# Patient Record
Sex: MALE
[De-identification: names, ages, dates, MRNs, and addresses within clinical notes are randomized per-mention and may not be internally consistent; named-entity substitution may affect disease eponyms.]

---

## 2017-01-01 ENCOUNTER — RX ONLY (RX ONLY)
Age: 52
End: 2017-01-01

## 2017-01-26 ENCOUNTER — TELEPHONE (OUTPATIENT)
Dept: LAB | Facility: CLINIC | Age: 52
End: 2017-01-26

## 2017-01-26 DIAGNOSIS — E03.9 HYPOTHYROIDISM, UNSPECIFIED TYPE: Primary | ICD-10-CM

## 2017-01-31 ENCOUNTER — TELEPHONE (OUTPATIENT)
Dept: LAB | Facility: CLINIC | Age: 52
End: 2017-01-31

## 2017-01-31 DIAGNOSIS — Z00.00 ENCOUNTER FOR ROUTINE ADULT HEALTH EXAMINATION WITHOUT ABNORMAL FINDINGS: Primary | ICD-10-CM

## 2017-01-31 DIAGNOSIS — Z00.00 ENCOUNTER FOR ROUTINE ADULT HEALTH EXAMINATION WITHOUT ABNORMAL FINDINGS: ICD-10-CM

## 2017-01-31 DIAGNOSIS — E03.9 HYPOTHYROIDISM, UNSPECIFIED TYPE: ICD-10-CM

## 2017-01-31 LAB
T4 FREE SERPL-MCNC: 1.25 NG/DL (ref 0.76–1.46)
TSH SERPL DL<=0.005 MIU/L-ACNC: 4.35 MU/L (ref 0.4–4)

## 2017-01-31 PROCEDURE — 84443 ASSAY THYROID STIM HORMONE: CPT | Performed by: FAMILY MEDICINE

## 2017-01-31 PROCEDURE — 80061 LIPID PANEL: CPT | Performed by: FAMILY MEDICINE

## 2017-01-31 PROCEDURE — 84439 ASSAY OF FREE THYROXINE: CPT | Performed by: FAMILY MEDICINE

## 2017-01-31 PROCEDURE — 36415 COLL VENOUS BLD VENIPUNCTURE: CPT | Performed by: FAMILY MEDICINE

## 2017-01-31 NOTE — TELEPHONE ENCOUNTER
Cali is requesting his fasting cholesterol. I drew him this morning and can hold the sample until Feb 3. Please order as future if appropriate and I will watch for it.    Thank you  Katty BROOKS [facility]

## 2017-02-01 ENCOUNTER — TELEPHONE (OUTPATIENT)
Dept: FAMILY MEDICINE | Facility: CLINIC | Age: 52
End: 2017-02-01

## 2017-02-01 DIAGNOSIS — E03.8 OTHER SPECIFIED HYPOTHYROIDISM: Primary | ICD-10-CM

## 2017-02-01 LAB
CHOLEST SERPL-MCNC: 215 MG/DL
HDLC SERPL-MCNC: 58 MG/DL
LDLC SERPL CALC-MCNC: 143 MG/DL
NONHDLC SERPL-MCNC: 157 MG/DL
TRIGL SERPL-MCNC: 71 MG/DL

## 2017-02-01 RX ORDER — LEVOTHYROXINE SODIUM 150 UG/1
150 TABLET ORAL DAILY
Qty: 90 TABLET | Refills: 0 | Status: SHIPPED | OUTPATIENT
Start: 2017-02-01 | End: 2017-03-01

## 2017-02-28 DIAGNOSIS — J30.2 SEASONAL ALLERGIC RHINITIS, UNSPECIFIED ALLERGIC RHINITIS TRIGGER: ICD-10-CM

## 2017-03-01 DIAGNOSIS — E03.8 OTHER SPECIFIED HYPOTHYROIDISM: ICD-10-CM

## 2017-03-01 NOTE — TELEPHONE ENCOUNTER
levothyroxine (SYNTHROID/LEVOTHROID) 150 MCG tablet       Last Written Prescription Date: 2/1/2017  Last Quantity: 90, # refills: 0  Last Office Visit with [facility], [facility] or [facility] prescribing provider: 11/23/2016        TSH   Date Value Ref Range Status   01/31/2017 4.35 (H) 0.40 - 4.00 mU/L Final

## 2017-03-01 NOTE — TELEPHONE ENCOUNTER
fluticasone (FLONASE) 50 MCG/ACT spray        Last Written Prescription Date: 11/23/2016  Last Fill Quantity: 1,  # refills: 1   Last Office Visit with [facility], [facility] or [facility] prescribing provider: 11/23/2016

## 2017-03-02 RX ORDER — LEVOTHYROXINE SODIUM 150 UG/1
TABLET ORAL
Qty: 90 TABLET | Refills: 0 | Status: SHIPPED | OUTPATIENT
Start: 2017-03-02 | End: 2017-05-09

## 2017-03-02 RX ORDER — FLUTICASONE PROPIONATE 50 MCG
SPRAY, SUSPENSION (ML) NASAL
Qty: 16 ML | Refills: 1 | Status: SHIPPED | OUTPATIENT
Start: 2017-03-02 | End: 2017-04-30

## 2017-03-02 NOTE — TELEPHONE ENCOUNTER
Prescription approved per [facility] Refill Protocol.  Tawana Szymanski RN

## 2017-03-30 DIAGNOSIS — E03.8 OTHER SPECIFIED HYPOTHYROIDISM: ICD-10-CM

## 2017-03-30 RX ORDER — LEVOTHYROXINE SODIUM 150 UG/1
TABLET ORAL
Qty: 90 TABLET | Refills: 0 | OUTPATIENT
Start: 2017-03-30

## 2017-03-30 NOTE — TELEPHONE ENCOUNTER
CHANGED DOSE Levothyroxine from 137 to 150 mcg per 1-31-17 lab result note and to recheck TSH 2 months   I called patient.  He is taking the 150 mcg and still has a supply on hand.  He said to disregard the request for 137mcg.   He will call the [facility] and sched lab only appt.  I ordered future TSH.      I thought the order was for 137 but now after 10 minutes it appears it is for 150 ????       levothyroxine (SYNTHROID, LEVOTHROID) 137 MCG tablet 90 tablet 0 11/1/2016  --   Sig: Take 1 tablet (137 mcg) by mouth daily          Last Written Prescription Date: 11/01/2016  Last Quantity: 90, # refills: 0  Last Office Visit with [facility] or [facility] prescribing provider: 11/2016        TSH   Date Value Ref Range Status   01/31/2017 4.35 (H) 0.40 - 4.00 mU/L Final     Notes Recorded by Semaj Conrad MD on 2/1/2017 at 11:43 AM  Your labs generally look fine.  Your TSH (thyroid test) is elevated suggesting that we should increase the dose of your synthroid to 150 micrograms per day.  I have sent this new prescription to your pharmacy.  We should recheck your tsh in two months.

## 2017-03-30 NOTE — TELEPHONE ENCOUNTER
I sent denial to pharmacy that too soon to fill as script was just sent in on 3/2/17  Patient will have TSH labs done at [facility]  Lab order already placed    Katherine Vuong RN

## 2017-04-07 DIAGNOSIS — E03.8 OTHER SPECIFIED HYPOTHYROIDISM: ICD-10-CM

## 2017-04-10 DIAGNOSIS — E03.8 OTHER SPECIFIED HYPOTHYROIDISM: ICD-10-CM

## 2017-04-10 PROCEDURE — 36415 COLL VENOUS BLD VENIPUNCTURE: CPT | Performed by: FAMILY MEDICINE

## 2017-04-10 PROCEDURE — 84443 ASSAY THYROID STIM HORMONE: CPT | Performed by: FAMILY MEDICINE

## 2017-04-11 LAB — TSH SERPL DL<=0.005 MIU/L-ACNC: 0.65 MU/L (ref 0.4–4)

## 2017-04-12 RX ORDER — LEVOTHYROXINE SODIUM 150 UG/1
TABLET ORAL
Qty: 90 TABLET | Refills: 0 | OUTPATIENT
Start: 2017-04-12

## 2017-04-30 DIAGNOSIS — J30.2 SEASONAL ALLERGIC RHINITIS, UNSPECIFIED ALLERGIC RHINITIS TRIGGER: ICD-10-CM

## 2017-05-01 RX ORDER — FLUTICASONE PROPIONATE 50 MCG
SPRAY, SUSPENSION (ML) NASAL
Qty: 16 ML | Refills: 1 | Status: SHIPPED | OUTPATIENT
Start: 2017-05-01 | End: 2018-12-19

## 2017-05-01 NOTE — TELEPHONE ENCOUNTER
Prescription approved per [facility] Refill Protocol.    Shirley Frank RN

## 2017-05-01 NOTE — TELEPHONE ENCOUNTER
Pending Prescriptions:                       Disp   Refills    fluticasone (FLONASE) 50 MCG/ACT spray [P*16 mL  1            Sig: USE TWO SPRAYS INTO EACH NOSTRIL ONCE DAILY          Last Written Prescription Date: 3/2/17  Last Fill Quantity: 16 ml,  # refills: 1   Last Office Visit with [facility], [facility] or [facility] prescribing provider: 11/23/16

## 2017-05-05 ENCOUNTER — OFFICE VISIT (OUTPATIENT)
Dept: FAMILY MEDICINE | Facility: CLINIC | Age: 52
End: 2017-05-05
Payer: COMMERCIAL

## 2017-05-05 VITALS
BODY MASS INDEX: 26.66 KG/M2 | SYSTOLIC BLOOD PRESSURE: 128 MMHG | DIASTOLIC BLOOD PRESSURE: 74 MMHG | HEART RATE: 61 BPM | WEIGHT: 180 LBS | RESPIRATION RATE: 16 BRPM | TEMPERATURE: 98.1 F | HEIGHT: 69 IN | OXYGEN SATURATION: 97 %

## 2017-05-05 DIAGNOSIS — R10.13 DYSPEPSIA: Primary | ICD-10-CM

## 2017-05-05 LAB
BASOPHILS # BLD AUTO: 0 10E9/L (ref 0–0.2)
BASOPHILS NFR BLD AUTO: 0.2 %
DIFFERENTIAL METHOD BLD: ABNORMAL
EOSINOPHIL # BLD AUTO: 0.4 10E9/L (ref 0–0.7)
EOSINOPHIL NFR BLD AUTO: 4.3 %
ERYTHROCYTE [DISTWIDTH] IN BLOOD BY AUTOMATED COUNT: 12.4 % (ref 10–15)
HCT VFR BLD AUTO: 38.4 % (ref 40–53)
HGB BLD-MCNC: 13.2 G/DL (ref 13.3–17.7)
LYMPHOCYTES # BLD AUTO: 1.9 10E9/L (ref 0.8–5.3)
LYMPHOCYTES NFR BLD AUTO: 22.7 %
MCH RBC QN AUTO: 31.5 PG (ref 26.5–33)
MCHC RBC AUTO-ENTMCNC: 34.4 G/DL (ref 31.5–36.5)
MCV RBC AUTO: 92 FL (ref 78–100)
MONOCYTES # BLD AUTO: 0.8 10E9/L (ref 0–1.3)
MONOCYTES NFR BLD AUTO: 9.3 %
NEUTROPHILS # BLD AUTO: 5.2 10E9/L (ref 1.6–8.3)
NEUTROPHILS NFR BLD AUTO: 63.5 %
PLATELET # BLD AUTO: 242 10E9/L (ref 150–450)
RBC # BLD AUTO: 4.19 10E12/L (ref 4.4–5.9)
WBC # BLD AUTO: 8.2 10E9/L (ref 4–11)

## 2017-05-05 PROCEDURE — 80053 COMPREHEN METABOLIC PANEL: CPT | Performed by: FAMILY MEDICINE

## 2017-05-05 PROCEDURE — 36415 COLL VENOUS BLD VENIPUNCTURE: CPT | Performed by: FAMILY MEDICINE

## 2017-05-05 PROCEDURE — 99214 OFFICE O/P EST MOD 30 MIN: CPT | Performed by: FAMILY MEDICINE

## 2017-05-05 PROCEDURE — 85025 COMPLETE CBC W/AUTO DIFF WBC: CPT | Performed by: FAMILY MEDICINE

## 2017-05-05 PROCEDURE — 83690 ASSAY OF LIPASE: CPT | Performed by: FAMILY MEDICINE

## 2017-05-05 RX ORDER — OMEPRAZOLE 40 MG/1
40 CAPSULE, DELAYED RELEASE ORAL DAILY
Qty: 30 CAPSULE | Refills: 1 | Status: SHIPPED | OUTPATIENT
Start: 2017-05-05 | End: 2018-12-19

## 2017-05-05 NOTE — NURSING NOTE
"Chief Complaint   Patient presents with     Abdominal Pain     stomach pain, diarrhea, steady pain, tightness x 7 days, but has been on and off for about 1 month, eating can trigger, taking Tums       Initial /74 (BP Location: Right arm, Patient Position: Chair, Cuff Size: Adult Large)  Pulse 61  Temp 98.1  F (36.7  C) (Oral)  Resp 16  Ht 5' 9\" (1.753 m)  Wt 180 lb (81.6 kg)  SpO2 97%  BMI 26.58 kg/m2 Estimated body mass index is 26.58 kg/(m^2) as calculated from the following:    Height as of this encounter: 5' 9\" (1.753 m).    Weight as of this encounter: 180 lb (81.6 kg).  Medication Reconciliation: ari Orellana CMA      "

## 2017-05-05 NOTE — PROGRESS NOTES
"  SUBJECTIVE:                                                    Neymar Traore is a 51 year old male who presents to clinic today for the following health issues:    Patient presents with:  Abdominal Pain: stomach pain, diarrhea, steady pain, tightness x 7 days, but has been on and off for about 1 month, eating can trigger, taking Tums    Patient with abdominal pain over the past week worsening but some symptoms intermittently over a month. Having midepigastric constant abdominal pain lasting 30 minutes to a few hours at a time.  Has had some worsening with meals at times but not consistently. Some improvement with calcium carbonate.    Patient Active Problem List    Diagnosis Date Noted     Pulmonary nodules 11/05/2016     Priority: Medium     Other specified hypothyroidism 10/21/2015     Priority: Medium     Hypothyroidism 04/03/2015     Priority: Medium     CHERISE (obstructive sleep apnea) 09/17/2014     Priority: Medium     BPH (benign prostatic hypertrophy) 06/29/2011     Priority: Medium     Social History   Substance Use Topics     Smoking status: Never Smoker     Smokeless tobacco: Never Used      Comment: <1 year of smoking, <1ppd over 30 years ago     Alcohol use 0.0 oz/week     0 Standard drinks or equivalent per week      Comment: 1-2 drinks per week-wine, has cut down     ROS:  CONSTITUTIONAL: No fever  GI: As noted    OBJECTIVE:                                                    /74 (BP Location: Right arm, Patient Position: Chair, Cuff Size: Adult Large)  Pulse 61  Temp 98.1  F (36.7  C) (Oral)  Resp 16  Ht 5' 9\" (1.753 m)  Wt 180 lb (81.6 kg)  SpO2 97%  BMI 26.58 kg/m2Body mass index is 26.58 kg/(m^2).  GENERAL APPEARANCE: healthy, alert and no distress  RESP: lungs clear to auscultation - no rales, rhonchi or wheezes  CV: regular rates and rhythm and no murmur, click or rub  ABDOMEN: soft, mild midepigastric tenderness without rebound or guarding, without hepatosplenomegaly or masses and " bowel sounds normal     ASSESSMENT/PLAN:                                                    1. Dyspepsia  Appears to be most consistent with dyspepsia. Recommend testing for H. pylori. Recommend trial of PPI and if improving symptoms continue for 2 months. Discussed dietary changes. If not improving consider right upper quadrant ultrasound to rule out gallbladder disease.  - omeprazole (PRILOSEC) 40 MG capsule; Take 1 capsule (40 mg) by mouth daily Take 30-60 minutes before a meal.  Dispense: 30 capsule; Refill: 1  - Lipase  - Comprehensive metabolic panel  - CBC with platelets differential  - H Pylori antigen stool; Future    James Radford MD  [facility]

## 2017-05-05 NOTE — MR AVS SNAPSHOT
After Visit Summary   5/5/2017    Neymar Traore    MRN: [id number]           Patient Information     Date Of Birth          1965        Visit Information        Provider Department      5/5/2017 2:15 PM Jmaes Radford MD [facility]        Today's Diagnoses     Dyspepsia    -  1       Follow-ups after your visit        Future tests that were ordered for you today     Open Future Orders        Priority Expected Expires Ordered    H Pylori antigen stool Routine  6/4/2017 5/5/2017            Who to contact     If you have questions or need follow up information about today's clinic visit or your schedule please contact [facility] directly at [phone].  Normal or non-critical lab and imaging results will be communicated to you by MyChart, letter or phone within 4 business days after the clinic has received the results. If you do not hear from us within 7 days, please contact the clinic through [employer] or phone. If you have a critical or abnormal lab result, we will notify you by phone as soon as possible.  Submit refill requests through [employer] or call your pharmacy and they will forward the refill request to us. Please allow 3 business days for your refill to be completed.          Additional Information About Your Visit        MyChart Information     [employer] gives you secure access to your electronic health record. If you see a primary care provider, you can also send messages to your care team and make appointments. If you have questions, please call your primary care clinic.  If you do not have a primary care provider, please call [phone] and they will assist you.        Care EveryWhere ID     This is your Care EveryWhere ID. This could be used by other organizations to access your [place] medical records  [id number]        Your Vitals Were     Pulse Temperature Respirations Height Pulse Oximetry BMI (Body Mass Index)    61 98.1  F (36.7  C) (Oral)  "16 5' 9\" (1.753 m) 97% 26.58 kg/m2       Blood Pressure from Last 3 Encounters:   05/05/17 128/74   11/23/16 129/80   10/27/16 127/84    Weight from Last 3 Encounters:   05/05/17 180 lb (81.6 kg)   11/23/16 179 lb (81.2 kg)   10/27/16 179 lb (81.2 kg)              We Performed the Following     CBC with platelets differential     Comprehensive metabolic panel     Lipase          Today's Medication Changes          These changes are accurate as of: 5/5/17  2:44 PM.  If you have any questions, ask your nurse or doctor.               Start taking these medicines.        Dose/Directions    omeprazole 40 MG capsule   Commonly known as:  priLOSEC   Used for:  Dyspepsia   Started by:  James Radford MD        Dose:  40 mg   Take 1 capsule (40 mg) by mouth daily Take 30-60 minutes before a meal.   Quantity:  30 capsule   Refills:  1            Where to get your medicines      These medications were sent to [place] [id number] [place] [id number]    Hours:  Tech issues with their phone system Phone:  [phone]     omeprazole 40 MG capsule                Primary Care Provider Office Phone # Fax #    James Radford [phone]       [facility] [address] [facility] [address]        Thank you!     Thank you for choosing [facility]  for your care. Our goal is always to provide you with excellent care. Hearing back from our patients is one way we can continue to improve our services. Please take a few minutes to complete the written survey that you may receive in the mail after your visit with us. Thank you!             Your Updated Medication List - Protect others around you: Learn how to safely use, store and throw away your medicines at www.disposemymeds.org.          This list is accurate as of: 5/5/17  2:44 PM.  Always use your most recent med list.                   Brand Name Dispense Instructions for " use    cetirizine 10 MG tablet    zyrTEC    30 tablet    Take 10 mg by mouth daily       fluticasone 50 MCG/ACT spray    FLONASE    16 mL    USE TWO SPRAYS INTO EACH NOSTRIL ONCE DAILY       levothyroxine 150 MCG tablet    SYNTHROID/LEVOTHROID    90 tablet    TAKE 1 TABLET BY MOUTH EVERY DAY       omeprazole 40 MG capsule    priLOSEC    30 capsule    Take 1 capsule (40 mg) by mouth daily Take 30-60 minutes before a meal.

## 2017-05-06 LAB
ALBUMIN SERPL-MCNC: 3.7 G/DL (ref 3.4–5)
ALP SERPL-CCNC: 68 U/L (ref 40–150)
ALT SERPL W P-5'-P-CCNC: 13 U/L (ref 0–70)
ANION GAP SERPL CALCULATED.3IONS-SCNC: 5 MMOL/L (ref 3–14)
AST SERPL W P-5'-P-CCNC: 10 U/L (ref 0–45)
BILIRUB SERPL-MCNC: 0.3 MG/DL (ref 0.2–1.3)
BUN SERPL-MCNC: 16 MG/DL (ref 7–30)
CALCIUM SERPL-MCNC: 8.4 MG/DL (ref 8.5–10.1)
CHLORIDE SERPL-SCNC: 104 MMOL/L (ref 94–109)
CO2 SERPL-SCNC: 33 MMOL/L (ref 20–32)
CREAT SERPL-MCNC: 1.1 MG/DL (ref 0.66–1.25)
GFR SERPL CREATININE-BSD FRML MDRD: 70 ML/MIN/1.7M2
GLUCOSE SERPL-MCNC: 97 MG/DL (ref 70–99)
LIPASE SERPL-CCNC: 80 U/L (ref 73–393)
POTASSIUM SERPL-SCNC: 3.8 MMOL/L (ref 3.4–5.3)
PROT SERPL-MCNC: 7.2 G/DL (ref 6.8–8.8)
SODIUM SERPL-SCNC: 142 MMOL/L (ref 133–144)

## 2017-05-09 DIAGNOSIS — E03.8 OTHER SPECIFIED HYPOTHYROIDISM: ICD-10-CM

## 2017-05-09 NOTE — TELEPHONE ENCOUNTER
Pt was seen for an acute issue only in clinic but PCP was changed.    Levothyroxine     Last Written Prescription Date: 3/2/17  Last Quantity: 90, # refills: 0  Last Office Visit with [facility] or [facility] prescribing provider: 5/5/17 for acute issue        TSH   Date Value Ref Range Status   04/10/2017 0.65 0.40 - 4.00 mU/L Final     Gabi Brothers RN, BSN

## 2017-05-10 DIAGNOSIS — R10.13 DYSPEPSIA: ICD-10-CM

## 2017-05-10 PROCEDURE — 87338 HPYLORI STOOL AG IA: CPT | Performed by: FAMILY MEDICINE

## 2017-05-11 RX ORDER — LEVOTHYROXINE SODIUM 150 UG/1
TABLET ORAL
Qty: 90 TABLET | Refills: 3 | Status: SHIPPED | OUTPATIENT
Start: 2017-05-11 | End: 2017-11-01 | Stop reason: DRUGHIGH

## 2017-05-12 LAB
H PYLORI AG STL QL IA: NORMAL
MICRO REPORT STATUS: NORMAL
SPECIMEN SOURCE: NORMAL

## 2017-05-24 ENCOUNTER — MYC MEDICAL ADVICE (OUTPATIENT)
Dept: FAMILY MEDICINE | Facility: CLINIC | Age: 52
End: 2017-05-24

## 2017-10-23 DIAGNOSIS — E03.8 OTHER SPECIFIED HYPOTHYROIDISM: Primary | ICD-10-CM

## 2017-10-23 LAB
T4 FREE SERPL-MCNC: 1.17 NG/DL (ref 0.76–1.46)
TSH SERPL DL<=0.005 MIU/L-ACNC: 4.67 MU/L (ref 0.4–4)

## 2017-10-23 PROCEDURE — 84439 ASSAY OF FREE THYROXINE: CPT | Performed by: FAMILY MEDICINE

## 2017-10-23 PROCEDURE — 36415 COLL VENOUS BLD VENIPUNCTURE: CPT | Performed by: FAMILY MEDICINE

## 2017-10-23 PROCEDURE — 84443 ASSAY THYROID STIM HORMONE: CPT | Performed by: FAMILY MEDICINE

## 2017-10-31 ENCOUNTER — TELEPHONE (OUTPATIENT)
Dept: FAMILY MEDICINE | Facility: CLINIC | Age: 52
End: 2017-10-31

## 2017-10-31 DIAGNOSIS — E03.9 HYPOTHYROIDISM, UNSPECIFIED TYPE: Primary | ICD-10-CM

## 2017-11-01 RX ORDER — LEVOTHYROXINE SODIUM 175 UG/1
175 TABLET ORAL DAILY
Qty: 90 TABLET | Refills: 0 | Status: SHIPPED | OUTPATIENT
Start: 2017-11-01 | End: 2018-12-19 | Stop reason: DRUGHIGH

## 2017-11-01 NOTE — TELEPHONE ENCOUNTER
TSH elevated suggests undertreated thyroid disease.  Make sure he is taking first thing in AM consistently and no with food or other meds (30-60 min prior).  Previous increases in the past year or so suggests that he is not consistent or had been off med at time of testing (was at 125 mg a year ago).  May consider increase if he has taken as recommended.

## 2017-11-01 NOTE — TELEPHONE ENCOUNTER
Pt is taking med as directed with no missed doses-       Please adjust does as needed.    175 mg sent in per VO from PCP-   Lab recheck in 8-12 weeks order placed    Pt expressed understanding and acceptance of the plan.  Pt had no further questions at this time.  Advised can call back to clinic at any time with concerns.       Message handled by Nurse Triage with [facility] - provider name: James Radford MD.      Carin Murphy RN

## 2017-12-26 DIAGNOSIS — E03.9 HYPOTHYROIDISM, UNSPECIFIED TYPE: ICD-10-CM

## 2017-12-26 LAB
T4 FREE SERPL-MCNC: 1.42 NG/DL (ref 0.76–1.46)
TSH SERPL DL<=0.005 MIU/L-ACNC: 0.2 MU/L (ref 0.4–4)

## 2017-12-26 PROCEDURE — 84439 ASSAY OF FREE THYROXINE: CPT | Performed by: FAMILY MEDICINE

## 2017-12-26 PROCEDURE — 84443 ASSAY THYROID STIM HORMONE: CPT | Performed by: FAMILY MEDICINE

## 2017-12-26 PROCEDURE — 36415 COLL VENOUS BLD VENIPUNCTURE: CPT | Performed by: FAMILY MEDICINE

## 2017-12-29 ENCOUNTER — TELEPHONE (OUTPATIENT)
Dept: FAMILY MEDICINE | Facility: CLINIC | Age: 52
End: 2017-12-29

## 2017-12-29 DIAGNOSIS — E03.9 HYPOTHYROIDISM: Primary | ICD-10-CM

## 2017-12-29 RX ORDER — LEVOTHYROXINE SODIUM 150 UG/1
150 TABLET ORAL DAILY
Qty: 90 TABLET | Refills: 0 | Status: SHIPPED | OUTPATIENT
Start: 2017-12-29 | End: 2018-04-02

## 2017-12-29 NOTE — TELEPHONE ENCOUNTER
"Pt notified of abnormal TSH-   He will start 150 mg today.  He will be leaving for business trip to [place] tomorrow for 1 week.     He notes that he has felt \"my heart has been irregular for the last 3-4 weeks\"     He states he feels irregular beats.   Denies chest pain, no jaw, arm or back pain.   No SOB.  He is able to work out/run as normal without issues.     Advised he should f/u with PCP on this but could be from abnormal tsh.   He states he will schedule OV when returns from business trip.   Advised to ER with any chest pain, or other symptoms as above.     Pt expressed understanding and acceptance of the plan.  Pt had no further questions at this time.  Advised can call back to clinic at any time with concerns.       Carin Murphy RN    "

## 2018-01-11 ENCOUNTER — E-VISIT (OUTPATIENT)
Dept: FAMILY MEDICINE | Facility: CLINIC | Age: 53
End: 2018-01-11
Payer: COMMERCIAL

## 2018-01-11 DIAGNOSIS — J01.00 ACUTE NON-RECURRENT MAXILLARY SINUSITIS: Primary | ICD-10-CM

## 2018-01-11 PROCEDURE — 99444 ZZC PHYSICIAN ONLINE EVALUATION & MANAGEMENT SERVICE: CPT | Performed by: FAMILY MEDICINE

## 2018-01-11 NOTE — TELEPHONE ENCOUNTER
LEVOTHYROXINE     Last Written Prescription Date: 03/02/17  Last Quantity: 90, # refills: 0  Last Office Visit with [facility] or [facility] prescribing provider: 11/23/16        TSH   Date Value Ref Range Status   01/31/2017 4.35 (H) 0.40 - 4.00 mU/L Final     Caterina Hobbs MA     General Sunscreen Counseling: I recommended a broad spectrum sunscreen with a SPF of 30 or higher.  I explained that SPF 30 sunscreens block approximately 97 percent of the sun's harmful rays.  Sunscreens should be applied at least 15 minutes prior to expected sun exposure and then every 2 hours after that as long as sun exposure continues. If swimming or exercising sunscreen should be reapplied every 45 minutes to an hour after getting wet or sweating.  One ounce, or the equivalent of a shot glass full of sunscreen, is adequate to protect the skin not covered by a bathing suit. I also recommended a lip balm with a sunscreen as well. Sun protective clothing can be used in lieu of sunscreen but must be worn the entire time you are exposed to the sun's rays. Detail Level: Generalized

## 2018-01-12 RX ORDER — AMOXICILLIN 500 MG/1
1000 CAPSULE ORAL 2 TIMES DAILY
Qty: 40 CAPSULE | Refills: 0 | Status: SHIPPED | OUTPATIENT
Start: 2018-01-12 | End: 2018-01-22

## 2018-01-26 DIAGNOSIS — E03.9 HYPOTHYROIDISM, UNSPECIFIED TYPE: ICD-10-CM

## 2018-01-26 RX ORDER — LEVOTHYROXINE SODIUM 175 UG/1
TABLET ORAL
Qty: 90 TABLET | Refills: 0 | OUTPATIENT
Start: 2018-01-26

## 2018-01-26 NOTE — TELEPHONE ENCOUNTER
Per December result note:  Notes Recorded by James Radford MD on 12/29/2017 at 8:40 AM  TSH low showing just slight overtreatment at this dose - I would go back to 150 mcg daily.  If that is not keeping him in range then we may set up an alternating schedule with 150/175 mcg but if 150 mcg can keep him in range with same dose daily that is ideal.    Pt is to be on 150mcg and follow up in 6-8 weeks for a lab only    Gabi Brothers RN, BSN

## 2018-01-26 NOTE — TELEPHONE ENCOUNTER
"Requested Prescriptions   Pending Prescriptions Disp Refills     levothyroxine (SYNTHROID/LEVOTHROID) 175 MCG tablet [Pharmacy Med Name: LEVOTHYROXINE 175 MCG TABLET] 90 tablet 0    Last Written Prescription Date:  11/01/2017  Last Fill Quantity: 90 tablet,  # refills: 0   Last Office Visit with 01/11/2018:     Future Office Visit:      Sig: TAKE 1 TABLET (175 MCG) BY MOUTH DAILY. ONE TIME FILL AT NEW DOSE, WILL NEED FOLLOWUP LAB    Thyroid Protocol Failed    1/26/2018  1:03 AM       Failed - Normal TSH on file in past 12 months    Recent Labs   Lab Test  12/26/17   0853   TSH  0.20*             Passed - Patient is 12 years or older       Passed - Recent or future visit with authorizing provider's specialty    Patient had office visit in the last year or has a visit in the next 30 days with authorizing provider.  See \"Patient Info\" tab in inbasket, or \"Choose Columns\" in Meds & Orders section of the refill encounter.               Jeff Philip XRT  "

## 2018-04-02 DIAGNOSIS — E03.9 HYPOTHYROIDISM, UNSPECIFIED TYPE: Primary | ICD-10-CM

## 2018-04-02 NOTE — TELEPHONE ENCOUNTER
"Requested Prescriptions   Pending Prescriptions Disp Refills     levothyroxine (SYNTHROID/LEVOTHROID) 150 MCG tablet [Pharmacy Med Name: LEVOTHYROXINE 150 MCG TABLET] 90 tablet 0    Last Written Prescription Date:  12/29/2017  Last Fill Quantity: 90 tablet,  # refills: 0   Last office visit: 5/5/2017 with prescribing provider:  01/11/2018   Future Office Visit:     Sig: TAKE 1 TABLET (150 MCG) BY MOUTH DAILY    Thyroid Protocol Failed    4/2/2018  1:11 AM       Failed - Normal TSH on file in past 12 months    Recent Labs   Lab Test  12/26/17   0853   TSH  0.20*             Passed - Patient is 12 years or older       Passed - Recent (12 mo) or future (30 days) visit within the authorizing provider's specialty    Patient had office visit in the last 12 months or has a visit in the next 30 days with authorizing provider or within the authorizing provider's specialty.  See \"Patient Info\" tab in inbasket, or \"Choose Columns\" in Meds & Orders section of the refill encounter.              Jeff Philip XRT  "

## 2018-04-03 ENCOUNTER — HOSPITAL ENCOUNTER (OUTPATIENT)
Dept: CT IMAGING | Facility: CLINIC | Age: 53
Discharge: HOME | End: 2018-04-03
Attending: FAMILY MEDICINE | Admitting: FAMILY MEDICINE
Payer: COMMERCIAL

## 2018-04-03 DIAGNOSIS — R91.8 PULMONARY NODULES: ICD-10-CM

## 2018-04-03 PROCEDURE — 71250 CT THORAX DX C-: CPT

## 2018-04-04 ENCOUNTER — TELEPHONE (OUTPATIENT)
Dept: FAMILY MEDICINE | Facility: CLINIC | Age: 53
End: 2018-04-04

## 2018-04-04 DIAGNOSIS — E03.9 HYPOTHYROIDISM, UNSPECIFIED TYPE: ICD-10-CM

## 2018-04-04 LAB — TSH SERPL DL<=0.005 MIU/L-ACNC: 2.83 MU/L (ref 0.4–4)

## 2018-04-04 PROCEDURE — 84443 ASSAY THYROID STIM HORMONE: CPT | Performed by: FAMILY MEDICINE

## 2018-04-04 PROCEDURE — 36415 COLL VENOUS BLD VENIPUNCTURE: CPT | Performed by: FAMILY MEDICINE

## 2018-04-04 RX ORDER — LEVOTHYROXINE SODIUM 150 UG/1
TABLET ORAL
Qty: 90 TABLET | Refills: 3 | Status: SHIPPED | OUTPATIENT
Start: 2018-04-04 | End: 2018-12-26

## 2018-04-04 NOTE — TELEPHONE ENCOUNTER
Pt notified of below     Pt expressed understanding and acceptance of the plan.  Pt had no further questions at this time.  Advised can call back to clinic at any time with concerns.       Carin Murphy RN

## 2018-04-04 NOTE — TELEPHONE ENCOUNTER
LM for call back -  See below     Carin Murphy, RN                Unchanged very small nodules in non-smoker stable over time - no follow-up needed as this is very likely to be benign.

## 2018-04-05 ENCOUNTER — TRANSFERRED RECORDS (OUTPATIENT)
Dept: HEALTH INFORMATION MANAGEMENT | Facility: CLINIC | Age: 53
End: 2018-04-05

## 2018-12-19 ENCOUNTER — OFFICE VISIT (OUTPATIENT)
Dept: FAMILY MEDICINE | Facility: CLINIC | Age: 53
End: 2018-12-19
Payer: COMMERCIAL

## 2018-12-19 VITALS
DIASTOLIC BLOOD PRESSURE: 82 MMHG | SYSTOLIC BLOOD PRESSURE: 124 MMHG | RESPIRATION RATE: 16 BRPM | OXYGEN SATURATION: 97 % | HEART RATE: 62 BPM | BODY MASS INDEX: 26.81 KG/M2 | HEIGHT: 69 IN | TEMPERATURE: 98.1 F | WEIGHT: 181 LBS

## 2018-12-19 DIAGNOSIS — Z23 NEED FOR PROPHYLACTIC VACCINATION AND INOCULATION AGAINST INFLUENZA: ICD-10-CM

## 2018-12-19 DIAGNOSIS — Z00.00 ROUTINE GENERAL MEDICAL EXAMINATION AT A HEALTH CARE FACILITY: Primary | ICD-10-CM

## 2018-12-19 DIAGNOSIS — E03.9 HYPOTHYROIDISM, UNSPECIFIED TYPE: ICD-10-CM

## 2018-12-19 LAB
ANION GAP SERPL CALCULATED.3IONS-SCNC: 4 MMOL/L (ref 3–14)
BUN SERPL-MCNC: 14 MG/DL (ref 7–30)
CALCIUM SERPL-MCNC: 8.8 MG/DL (ref 8.5–10.1)
CHLORIDE SERPL-SCNC: 106 MMOL/L (ref 94–109)
CHOLEST SERPL-MCNC: 237 MG/DL
CO2 SERPL-SCNC: 29 MMOL/L (ref 20–32)
CREAT SERPL-MCNC: 1.11 MG/DL (ref 0.66–1.25)
ERYTHROCYTE [DISTWIDTH] IN BLOOD BY AUTOMATED COUNT: 12.8 % (ref 10–15)
GFR SERPL CREATININE-BSD FRML MDRD: 74 ML/MIN/{1.73_M2}
GLUCOSE SERPL-MCNC: 92 MG/DL (ref 70–99)
HCT VFR BLD AUTO: 39.5 % (ref 40–53)
HDLC SERPL-MCNC: 46 MG/DL
HGB BLD-MCNC: 13.4 G/DL (ref 13.3–17.7)
LDLC SERPL CALC-MCNC: 163 MG/DL
MCH RBC QN AUTO: 31.5 PG (ref 26.5–33)
MCHC RBC AUTO-ENTMCNC: 33.9 G/DL (ref 31.5–36.5)
MCV RBC AUTO: 93 FL (ref 78–100)
NONHDLC SERPL-MCNC: 191 MG/DL
PLATELET # BLD AUTO: 211 10E9/L (ref 150–450)
POTASSIUM SERPL-SCNC: 4.2 MMOL/L (ref 3.4–5.3)
RBC # BLD AUTO: 4.25 10E12/L (ref 4.4–5.9)
SODIUM SERPL-SCNC: 139 MMOL/L (ref 133–144)
TRIGL SERPL-MCNC: 142 MG/DL
TSH SERPL DL<=0.005 MIU/L-ACNC: 1.94 MU/L (ref 0.4–4)
WBC # BLD AUTO: 4.1 10E9/L (ref 4–11)

## 2018-12-19 PROCEDURE — 84443 ASSAY THYROID STIM HORMONE: CPT | Performed by: FAMILY MEDICINE

## 2018-12-19 PROCEDURE — 90471 IMMUNIZATION ADMIN: CPT | Performed by: FAMILY MEDICINE

## 2018-12-19 PROCEDURE — 80048 BASIC METABOLIC PNL TOTAL CA: CPT | Performed by: FAMILY MEDICINE

## 2018-12-19 PROCEDURE — 36415 COLL VENOUS BLD VENIPUNCTURE: CPT | Performed by: FAMILY MEDICINE

## 2018-12-19 PROCEDURE — 80061 LIPID PANEL: CPT | Performed by: FAMILY MEDICINE

## 2018-12-19 PROCEDURE — 90686 IIV4 VACC NO PRSV 0.5 ML IM: CPT | Performed by: FAMILY MEDICINE

## 2018-12-19 PROCEDURE — 99396 PREV VISIT EST AGE 40-64: CPT | Mod: 25 | Performed by: FAMILY MEDICINE

## 2018-12-19 PROCEDURE — 85027 COMPLETE CBC AUTOMATED: CPT | Performed by: FAMILY MEDICINE

## 2018-12-19 ASSESSMENT — ENCOUNTER SYMPTOMS
JOINT SWELLING: 0
EYE PAIN: 0
FEVER: 0
CHILLS: 0
PALPITATIONS: 0
HEADACHES: 0
MYALGIAS: 0
FREQUENCY: 0
HEARTBURN: 0
SHORTNESS OF BREATH: 0
SORE THROAT: 0
DYSURIA: 0
HEMATURIA: 0
ARTHRALGIAS: 0
ABDOMINAL PAIN: 0
COUGH: 0
PARESTHESIAS: 0
HEMATOCHEZIA: 0
NAUSEA: 0
DIARRHEA: 0
WEAKNESS: 0
DIZZINESS: 0
CONSTIPATION: 0
NERVOUS/ANXIOUS: 0

## 2018-12-19 ASSESSMENT — MIFFLIN-ST. JEOR: SCORE: 1646.39

## 2018-12-19 NOTE — Clinical Note
Colonoscopy reported and normal 7/2014, PASCUAL done for [facility] and [facility]

## 2018-12-19 NOTE — PROGRESS NOTES
SUBJECTIVE:   CC: Neymar Traore is an 53 year old male who presents for preventative health visit.     Physical   Annual:     Getting at least 3 servings of Calcium per day:  NO    Bi-annual eye exam:  Yes    Dental care twice a year:  NO    Sleep apnea or symptoms of sleep apnea:  Excessive snoring and Sleep apnea    Diet:  Regular (no restrictions)    Frequency of exercise:  4-5 days/week    Duration of exercise:  45-60 minutes    Taking medications regularly:  Yes    Medication side effects:  Not applicable    Additional concerns today:  No    PHQ-2 Total Score: 0    History of hypothyroidism. Stable with levothyroxine.     History of mild-moderate obstructive sleep apnea. He did not tolerate the CPAP.     History of pulmonary nodules.     CT CHEST WITHOUT CONTRAST April 3, 2018 9:19 AM  IMPRESSION: Scattered small indeterminate pulmonary nodules in both  lungs measure 0.5 cm or smaller, and are unchanged dating back to  11/3/2016. Given the small size of these nodules and their stability  over time, these are highly likely to be of benign etiology.     Patient reports adverse effects while drinking red wine and beer. He describes this as a headache or groggy/cloudy feeling. Patient is able to drink other alcoholic beverages without issues.      Today's PHQ-2 Score:   PHQ-2 ( 1999 [employer]) 12/19/2018   Q1: Little interest or pleasure in doing things 0   Q2: Feeling down, depressed or hopeless 0   PHQ-2 Score 0   Q1: Little interest or pleasure in doing things Not at all   Q2: Feeling down, depressed or hopeless Not at all   PHQ-2 Score 0       Abuse: Current or Past(Physical, Sexual or Emotional)- No  Do you feel safe in your environment? Yes    Social History     Tobacco Use     Smoking status: Never Smoker     Smokeless tobacco: Never Used     Tobacco comment: <1 year of smoking, <1ppd over 30 years ago   Substance Use Topics     Alcohol use: Yes     Alcohol/week: 0.0 oz     Comment: 1-2 drinks per week-wine,  has cut down     Alcohol Use 12/19/2018   If you drink alcohol do you typically have greater than 3 drinks per day OR greater than 7 drinks per week? No       Last PSA:   PSA   Date Value Ref Range Status   10/27/2016 0.76 0 - 4 ug/L Final     Comment:     Assay Method:  Chemiluminescence using Siemens Vista analyzer       Reviewed orders with patient. Reviewed health maintenance and updated orders accordingly - Yes  Patient Active Problem List   Diagnosis     BPH (benign prostatic hypertrophy)     CHERISE (obstructive sleep apnea)     Hypothyroidism     Other specified hypothyroidism     Pulmonary nodules     Past Surgical History:   Procedure Laterality Date     ARTHROSCOPY KNEE RT/LT      Numerous surgeries on Rt. Knee, R  Patella Fracture 1981     Microscopic Hematuria      Negative Cystoscopy and CT scan in 2009     Pulmonary Nodules      No change on repeat CT scan in 2009, no repeat imaging at this point     SEPTOPLASTY, TURBINOPLASTY, COMBINED N/A 12/17/2014    Procedure: COMBINED SEPTOPLASTY, TURBINOPLASTY;  Surgeon: Mingo Pelayo MD;  Location: [facility] OR     SURGICAL HISTORY OF -       Lt. Elbow tendon surgery     TONSILLECTOMY       VASECTOMY         Social History     Tobacco Use     Smoking status: Never Smoker     Smokeless tobacco: Never Used     Tobacco comment: <1 year of smoking, <1ppd over 30 years ago   Substance Use Topics     Alcohol use: Yes     Alcohol/week: 0.0 oz     Comment: 1-2 drinks per week-wine, has cut down     Family History   Problem Relation Age of Onset     Family History Negative Mother         alive 70     Thyroid Disease Mother         low thyroid     Family History Negative Father         alive 70     Family History Negative Sister         alive 40     Family History Negative Maternal Grandmother         alive-had ovarina cancer in her 50's age     Cerebrovascular Disease Maternal Grandmother      Diabetes Maternal Grandfather         late in life     Diabetes Paternal Grandmother       Cerebrovascular Disease Paternal Grandmother      Neurologic Disorder Paternal Grandmother         RLS     Family History Negative Paternal Grandfather      Cancer - colorectal No family hx of      Prostate Cancer No family hx of      C.A.D. No family hx of            Reviewed and updated as needed this visit by clinical staff  Tobacco  Allergies  Meds  Problems  Med Hx  Surg Hx  Fam Hx         Reviewed and updated as needed this visit by Provider  Tobacco  Allergies  Meds  Problems  Med Hx  Surg Hx  Fam Hx        Past Medical History:   Diagnosis Date     BPH (benign prostatic hyperplasia)      Sleep apnea     no CPAP     Thyroid disease       Past Surgical History:   Procedure Laterality Date     ARTHROSCOPY KNEE RT/LT      Numerous surgeries on Rt. Knee, R  Patella Fracture 1981     Microscopic Hematuria      Negative Cystoscopy and CT scan in 2009     Pulmonary Nodules      No change on repeat CT scan in 2009, no repeat imaging at this point     SEPTOPLASTY, TURBINOPLASTY, COMBINED N/A 12/17/2014    Procedure: COMBINED SEPTOPLASTY, TURBINOPLASTY;  Surgeon: Mingo Pelayo MD;  Location: [facility] OR     SURGICAL HISTORY OF -       Lt. Elbow tendon surgery     TONSILLECTOMY       VASECTOMY         Review of Systems   Constitutional: Negative for chills and fever.   HENT: Negative for congestion, ear pain, hearing loss and sore throat.    Eyes: Negative for pain and visual disturbance.   Respiratory: Negative for cough and shortness of breath.    Cardiovascular: Negative for chest pain, palpitations and peripheral edema.   Gastrointestinal: Negative for abdominal pain, constipation, diarrhea, heartburn, hematochezia and nausea.   Genitourinary: Negative for discharge, dysuria, frequency, genital sores, hematuria, impotence and urgency.   Musculoskeletal: Negative for arthralgias, joint swelling and myalgias.   Skin: Negative for rash.   Neurological: Negative for dizziness, weakness, headaches and  "paresthesias.   Psychiatric/Behavioral: Negative for mood changes. The patient is not nervous/anxious.      This document serves as a record of the services and decisions personally performed and made by James Radford MD. It was created on his behalf by Mckenna Oakley, a trained medical scribe. The creation of this document is based on the provider's statements to the medical scribe.  Mckenna Oakley 8:49 AM December 19, 2018    OBJECTIVE:   /82 (BP Location: Right arm, Patient Position: Chair, Cuff Size: Adult Regular)   Pulse 62   Temp 98.1  F (36.7  C) (Oral)   Resp 16   Ht 1.753 m (5' 9\")   Wt 82.1 kg (181 lb)   SpO2 97%   BMI 26.73 kg/m      Physical Exam  GENERAL: healthy, alert and no distress  EYES: Eyes grossly normal to inspection, PERRL and conjunctivae and sclerae normal  HENT: ear canals and TM's normal, nose and mouth without ulcers or lesions  NECK: no adenopathy, no asymmetry, masses, or scars and thyroid normal to palpation  RESP: lungs clear to auscultation - no rales, rhonchi or wheezes  CV: regular rate and rhythm, normal S1 S2, no S3 or S4, no murmur, click or rub, no peripheral edema and peripheral pulses strong  ABDOMEN: soft, nontender, no hepatosplenomegaly, no masses and bowel sounds normal   (male): normal male genitalia without lesions or urethral discharge, no hernia  MS: no gross musculoskeletal defects noted, no edema  SKIN: no suspicious lesions or rashes  NEURO: Normal strength and tone, mentation intact and speech normal  PSYCH: mentation appears normal, affect normal/bright    Diagnostic Test Results:  No results found for this or any previous visit (from the past 24 hour(s)).    ASSESSMENT/PLAN:   1. Routine general medical examination at a health care facility  - Basic metabolic panel  - Lipid panel reflex to direct LDL Fasting    2. Need for prophylactic vaccination and inoculation against influenza    3. Hypothyroidism, unspecified type  - CBC with platelets  - " "Basic metabolic panel  - TSH with free T4 reflex    COUNSELING:   Reviewed preventive health counseling, as reflected in patient instructions  Special attention given to:        Immunizations    Vaccinated for: Influenza         Prostate cancer screening        Colon cancer screening     Discussed risk and benefit of prostate cancer screening with PSA testing including benefit of early detection and risk of unnecessary biopsy and patient anxiety and complications of treatment on cancer that may not have affected patient's health.  Discussed recommendations from USPSTF.  Patient declines PSA testing at this time.    BP Readings from Last 1 Encounters:   12/19/18 124/82     Estimated body mass index is 26.73 kg/m  as calculated from the following:    Height as of this encounter: 1.753 m (5' 9\").    Weight as of this encounter: 82.1 kg (181 lb).    BP Screening:   Last 3 BP Readings:    BP Readings from Last 3 Encounters:   12/19/18 124/82   05/05/17 128/74   11/23/16 129/80       The following was recommended to the patient:  Re-screen BP within a year and recommended lifestyle modifications  Weight management plan: Discussed healthy diet and exercise guidelines     reports that  has never smoked. he has never used smokeless tobacco.      Counseling Resources:  ATP IV Guidelines  Pooled Cohorts Equation Calculator  FRAX Risk Assessment  ICSI Preventive Guidelines  Dietary Guidelines for Americans, 2010  USDA's MyPlate  ASA Prophylaxis  Lung CA Screening    The information in this document, created by the medical scribe for me, accurately reflects the services I personally performed and the decisions made by me. I have reviewed and approved this document for accuracy prior to leaving the patient care area.  December 19, 2018 9:14 AM    James Radford MD  [facility]    Injectable Influenza Immunization Documentation    1.  Is the person to be vaccinated sick today?   No    2. Does the person to be " vaccinated have an allergy to a component   of the vaccine?   No  Egg Allergy Algorithm Link    3. Has the person to be vaccinated ever had a serious reaction   to influenza vaccine in the past?   No    4. Has the person to be vaccinated ever had Guillain-Barré syndrome?   No    Form completed by Cooper Orellana CMA

## 2018-12-19 NOTE — PROGRESS NOTES
INTRANASAL INFLUENZA IMMUNIZATION DOCUMENTATION    1. Is the person to be vaccinated sick today? No    2. Does the person to be vaccinated have an allergy to a component of the influenza vaccine? No    3. Has the person to be vaccinated ever had a serious reaction to influenza vaccine in the past? No    4. Is the person to be vaccinated younger than age 2  years or older than age 49 years? No    5. Does the person to be vaccinated have a long-term health problem with heart disease, lung disease (including asthma), kidney disease, neurologic disease, liver disease, disease (e.g., diabetes), or anemia or another blood disorder? No    6.  If the person to be vaccinated is a child age 2 through 4 years, in the past 12 months, has a health care provider told you the child had wheezing or asthma? No    7. Does the person to be vaccinated have cancer, leukemia, HIV/AIDS, or any other immune system problem; or, in the past 3 months, have they taken medications that affect the immune system, such as prednisone, other steroids, drugs for the treatment of rheumatoid arthritis, Crohn s disease, or psoriasis or anticancer drugs; or have they had radiation treatments? No    8. Is the person to be vaccinated receiving influenza antiviral medications? No    9. Is the person to be vaccinated a child or teen age 2 through 17 years and receiving aspirin therapy or aspirin-containing therapy? No    10. Is the person to be vaccinated pregnant or could she become pregnant within the next month? No    11. Has the person to be vaccinated ever had Guillain-Barré syndrome? No    12. Does the person to be vaccinated live with or expect to have close contact with a person whose immune system is severely compromised and who must be in protective isolation (e.g., an isolation room of a bone marrow transplant unit)? No    13. Has the person to be vaccinated received any other vaccinations in the past 4 weeks? No    Injectable Influenza  Immunization Documentation    1.  Is the person to be vaccinated sick today?   No    2. Does the person to be vaccinated have an allergy to a component   of the vaccine?   No  Egg Allergy Algorithm Link    3. Has the person to be vaccinated ever had a serious reaction   to influenza vaccine in the past?   No    4. Has the person to be vaccinated ever had Guillain-Barré syndrome?   No    Form completed by Cooper Orellana CMA

## 2018-12-26 DIAGNOSIS — E03.9 HYPOTHYROIDISM, UNSPECIFIED TYPE: ICD-10-CM

## 2018-12-26 RX ORDER — LEVOTHYROXINE SODIUM 150 UG/1
TABLET ORAL
Qty: 90 TABLET | Refills: 3 | Status: SHIPPED | OUTPATIENT
Start: 2018-12-26 | End: 2020-01-08

## 2019-06-24 ENCOUNTER — TRANSFERRED RECORDS (OUTPATIENT)
Dept: HEALTH INFORMATION MANAGEMENT | Facility: CLINIC | Age: 54
End: 2019-06-24

## 2019-07-30 ENCOUNTER — TELEPHONE (OUTPATIENT)
Dept: FAMILY MEDICINE | Facility: CLINIC | Age: 54
End: 2019-07-30

## 2019-07-30 NOTE — TELEPHONE ENCOUNTER
Patient scheduled an upcoming appointment could you please triage?   I am experiencing pain in my shoulder, chest, and left arm. The pain moves and is intermittent. It is not related to movement. I am concerned that it could be related to my thyroid medication    Also, scheduled a lab only w/o orders    Please advise    Annette Iglesias

## 2019-07-31 ENCOUNTER — NURSE TRIAGE (OUTPATIENT)
Dept: FAMILY MEDICINE | Facility: CLINIC | Age: 54
End: 2019-07-31

## 2019-07-31 DIAGNOSIS — E03.9 HYPOTHYROIDISM, UNSPECIFIED TYPE: Primary | ICD-10-CM

## 2019-07-31 NOTE — TELEPHONE ENCOUNTER
LM for call back and sent my chart with information as below -     Lab not appropriate     Carin Murphy RN

## 2019-07-31 NOTE — TELEPHONE ENCOUNTER
Unlikely to be thyroid related.  Recommend follow-up in clinic.  I would be concerned about cervical radiculopathy/cervical nerve issue but need to see him to evaluate.    TSH   Date Value Ref Range Status   12/19/2018 1.94 0.40 - 4.00 mU/L Final

## 2019-07-31 NOTE — TELEPHONE ENCOUNTER
Patient called back from [employer] message sent. Patient states that for the past 2-3 weeks he has been having back pain that moved to his neck, down his left shoulder and now into his left shoulder to elbow.     He went to an urgent care outside of the [place] system last week and stated the provider told him it maybe Rotater Cuff injury from recent [employer]. He does not believe this is the cause of pain. Patient stated that medication (possibe baclofen)  was given at the urgent care but stopped taking because his daughter is a physician and she told him this medication was for MS>   Has been taking 800mg of Ibuprofen daily.     Patient believes it is his thyroid. He stopped taking Thyroid medications for a couple days and then went back on it. He is unable to determine if he is taking Levothyroxine 150mcg or 175mcg because he takes the medications out of the bottle they are labeled with from the pharmacy.     Nurse asked that he call his pharmacy to see what was last filled for him so that clinic has clarification on dosing.     Patient believes he needs his TSH drawn to see thyroid levels as he believes this is what is causing what he says are muscle spasms in back, neck and left shoulder down to elbow.     Patient has lab and office visit scheduled in the next week.     Please advise on below order T'd up for lab.     Triaged per Epic Triage Protocol, gave care advice which patient plans to follow.  See Care advice tab for more information.  Patent to call back if further questions or concerns.    Additional Information    Negative: Passed out (i.e., fainted, collapsed and was not responding)    Negative: Shock suspected (e.g., cold/pale/clammy skin, too weak to stand, low BP, rapid pulse)    Negative: Sounds like a life-threatening emergency to the triager    Negative: Major injury to the back (e.g., MVA, fall > 10 feet or 3 meters, penetrating injury, etc.)    Negative: Pain in the upper back over  the ribs (rib cage) that radiates (travels) into the chest    Negative: Pain in the upper back over the ribs (rib cage) and worsened by coughing (or clearly increases with breathing)    Negative: SEVERE back pain of sudden onset and age > 60    Negative: SEVERE abdominal pain (e.g., excruciating)    Negative: Abdominal pain and age > 60    Negative: Unable to urinate (or only a few drops) and bladder feels very full    Negative: Loss of bladder or bowel control (urine or bowel incontinence; wetting self, leaking stool) of new onset    Negative: Numbness (loss of sensation) in groin or rectal area    Negative: Pain radiates into groin, scrotum    Negative: Blood in urine (red, pink, or tea-colored)    Negative: Vomiting and pain over lower ribs of back (i.e., flank - kidney area)    Negative: Weakness of a leg or foot (e.g., unable to bear weight, dragging foot)    Negative: Patient sounds very sick or weak to the triager    Negative: Fever > 100.5 F (38.1 C) and flank pain    Negative: Pain or burning with passing urine (urination)    Negative: SEVERE back pain (e.g., excruciating, unable to do any normal activities) and not improved after pain medicine and CARE ADVICE    Negative: Numbness in an arm or hand (i.e., loss of sensation) and upper back pain    Negative: Numbness in a leg or foot (i.e., loss of sensation)    Negative: High-risk adult (e.g., history of cancer, history of HIV, or history of IV drug abuse)    Negative: Painful rash with multiple small blisters grouped together (i.e., dermatomal distribution or 'band' or 'stripe')    Negative: Pain radiates into the thigh or further down the leg, and in both legs    Negative: Age > 50 and no history of prior similar back pain    MODERATE back pain (e.g., interferes with normal activities) and present > 3 days    Patient wants to be seen    Negative: Back pain lasts > 2 weeks    Negative: Back pain is a chronic symptom (recurrent or ongoing AND lasting > 4  weeks)    Negative: Back pain    Negative: Caused by a twisting, bending, or lifting injury    Negative: Caused by overuse from recent vigorous activity (e.g., exercise, gardening, lifting and carrying, sports)    Negative: Preventing back strain, questions about    Negative: Shock suspected (e.g., cold/pale/clammy skin, too weak to stand, low BP, rapid pulse)    Negative: Similar pain previously and it was from 'heart attack'    Negative: Similar pain previously and it was from 'angina' and not relieved by nitroglycerin    Negative: Sounds like a life-threatening emergency to the triager    Negative: Chest pain    Negative: Followed an injury to shoulder    Negative: Difficulty breathing or unusual sweating (e.g., sweating without exertion)    Negative: Pain lasting > 5 minutes and pain also present in chest (Exception: pain is clearly made worse by movement)    Negative: Age > 40 and no obvious cause and pain even when not moving the arm (Exception: pain is clearly made worse by moving arm or bending neck)    Negative: Red area or streak and fever    Negative: Swollen joint and fever    Negative: Entire arm is swollen    Negative: Patient sounds very sick or weak to the triager    Negative: SEVERE pain (e.g., excruciating, unable to do any normal activities)    Negative: Shoulder pains with exertion (e.g., walking) and pain goes away on resting and not present now    Negative: Painful rash with multiple small blisters grouped together (i.e., dermatomal distribution or 'band' or 'stripe')    Negative: Looks like a boil, infected sore, deep ulcer or other infected rash (spreading redness, pus)    Negative: Localized rash is very painful (no fever)    Negative: Weakness (i.e., loss of strength) in hand or fingers (Exception: not truly weak; hand feels weak because of pain)    Negative: Numbness (i.e., loss of sensation) in hand or fingers    Negative: Unable to use arm at all and because of shoulder pain or  "stiffness    Patient wants to be seen    MODERATE pain (e.g., interferes with normal activities) and present > 3 days    Negative: Pain is worsened or caused by bending the neck    Negative: MILD pain (e.g., does not interfere with normal activities) and present > 7 days    Negative: Shoulder pain is a chronic symptom (recurrent or ongoing AND present > 4 weeks)    Shoulder pain    Negative: Caused by overuse from recent vigorous activity (e.g., tennis, other sports, work involving overhead lifting)    Negative: Caused by strained muscle    Answer Assessment - Initial Assessment Questions  1. ONSET: \"When did the pain begin?\"       2-3 weeks ago  2. LOCATION: \"Where does it hurt?\" (upper, mid or lower back)     Back, neck, and now moved to left shoulder and arm down to elbow. You could feel muscle spasm.   3. SEVERITY: \"How bad is the pain?\"  (e.g., Scale 1-10; mild, moderate, or severe)       - MODERATE (4-7): interferes with normal activities or awakens from sleep     -   4. PATTERN: \"Is the pain constant?\" (e.g., yes, no; constant, intermittent)       Comes and goes an hour or so.   5. RADIATION: \"Does the pain shoot into your legs or elsewhere?\"      No   6. CAUSE:  \"What do you think is causing the back pain?\"       Tyroid   7. BACK OVERUSE:  \"Any recent lifting of heavy objects, strenuous work or exercise?\"  No but had done some landscaping. Urgent Care thought Rotater Cuff. Weakness.   8. MEDICATIONS: \"What have you taken so far for the pain?\" (e.g., nothing, acetaminophen, NSAIDS)  Yes. They put me on two medications at Urgent Care. Daughter told him to stop them because daughter is a physician. Was taking 800mg if Ibuprofen.   9. NEUROLOGIC SYMPTOMS: \"Do you have any weakness, numbness, or problems with bowel/bladder control?\"     Weakness in left arm.   10. OTHER SYMPTOMS: \"Do you have any other symptoms?\" (e.g., fever, abdominal pain, burning with urination, blood in urine)       NO.   11. PREGNANCY: \"Is " "there any chance you are pregnant?\" (e.g., yes, no; LMP)       No. Male.    Protocols used: BACK PAIN-A-OH, SHOULDER PAIN-A-OH    Kaitlyn Nunez RN Flex      "

## 2019-07-31 NOTE — TELEPHONE ENCOUNTER
Sent my chart need to review symptoms and will not place lab order until speaking with pt      Carin Murphy RN

## 2019-08-06 ENCOUNTER — OFFICE VISIT (OUTPATIENT)
Dept: FAMILY MEDICINE | Facility: CLINIC | Age: 54
End: 2019-08-06
Payer: COMMERCIAL

## 2019-08-06 VITALS
SYSTOLIC BLOOD PRESSURE: 118 MMHG | WEIGHT: 182 LBS | HEIGHT: 69 IN | TEMPERATURE: 98.5 F | BODY MASS INDEX: 26.96 KG/M2 | DIASTOLIC BLOOD PRESSURE: 78 MMHG | HEART RATE: 69 BPM | OXYGEN SATURATION: 98 % | RESPIRATION RATE: 16 BRPM

## 2019-08-06 DIAGNOSIS — M25.522 PAIN IN JOINT INVOLVING UPPER ARM, LEFT: ICD-10-CM

## 2019-08-06 DIAGNOSIS — R25.3 MUSCULAR FASCICULATION: ICD-10-CM

## 2019-08-06 DIAGNOSIS — E03.9 HYPOTHYROIDISM, UNSPECIFIED TYPE: Primary | ICD-10-CM

## 2019-08-06 PROCEDURE — 84439 ASSAY OF FREE THYROXINE: CPT | Performed by: FAMILY MEDICINE

## 2019-08-06 PROCEDURE — 36415 COLL VENOUS BLD VENIPUNCTURE: CPT | Performed by: FAMILY MEDICINE

## 2019-08-06 PROCEDURE — 83735 ASSAY OF MAGNESIUM: CPT | Performed by: FAMILY MEDICINE

## 2019-08-06 PROCEDURE — 99213 OFFICE O/P EST LOW 20 MIN: CPT | Performed by: FAMILY MEDICINE

## 2019-08-06 PROCEDURE — 80048 BASIC METABOLIC PNL TOTAL CA: CPT | Performed by: FAMILY MEDICINE

## 2019-08-06 PROCEDURE — 84443 ASSAY THYROID STIM HORMONE: CPT | Performed by: FAMILY MEDICINE

## 2019-08-06 ASSESSMENT — MIFFLIN-ST. JEOR: SCORE: 1645.93

## 2019-08-06 NOTE — PROGRESS NOTES
Subjective     Neymar Traore is a 54 year old male who presents to clinic today for the following health issues:    HPI     Patient here with left-sided chest pain that radiates into his left shoulder. The pain feels similar to a muscle cramp. The symptoms started one month ago and has improved during the past week and a half. [place] E[place] performed. They thought the pain was related to his rotator cuff. He was treated with an injection and a muscle relaxer. The pain improves with ibuprofen. Patient also had severe right hip pain while walking with his wife. This pain improved the next morning. Patient is concerned the muscle pain is related to the dose change of his levothyroxine. Denies numbness.     Patient Active Problem List   Diagnosis     BPH (benign prostatic hypertrophy)     CHERISE (obstructive sleep apnea)     Hypothyroidism     Other specified hypothyroidism     Pulmonary nodules     Past Surgical History:   Procedure Laterality Date     ARTHROSCOPY KNEE RT/LT      Numerous surgeries on Rt. Knee, R  Patella Fracture 1981     Microscopic Hematuria      Negative Cystoscopy and CT scan in 2009     Pulmonary Nodules      No change on repeat CT scan in 2009, no repeat imaging at this point     SEPTOPLASTY, TURBINOPLASTY, COMBINED N/A 12/17/2014    Procedure: COMBINED SEPTOPLASTY, TURBINOPLASTY;  Surgeon: Mingo Pelayo MD;  Location: [facility] OR     SURGICAL HISTORY OF -       Lt. Elbow tendon surgery     TONSILLECTOMY       VASECTOMY         Social History     Tobacco Use     Smoking status: Never Smoker     Smokeless tobacco: Never Used     Tobacco comment: <1 year of smoking, <1ppd over 30 years ago   Substance Use Topics     Alcohol use: Yes     Alcohol/week: 0.0 oz     Comment: 1-2 drinks per week-wine, has cut down     Family History   Problem Relation Age of Onset     Family History Negative Mother         alive 70     Thyroid Disease Mother         low thyroid      "Family History Negative Father         alive 70     Family History Negative Sister         alive 40     Family History Negative Maternal Grandmother         alive-had ovarina cancer in her 50's age     Cerebrovascular Disease Maternal Grandmother      Diabetes Maternal Grandfather         late in life     Diabetes Paternal Grandmother      Cerebrovascular Disease Paternal Grandmother      Neurologic Disorder Paternal Grandmother         RLS     Family History Negative Paternal Grandfather      Cancer - colorectal No family hx of      Prostate Cancer No family hx of      C.A.D. No family hx of          Reviewed and updated as needed this visit by Provider  Tobacco  Allergies  Meds  Problems  Med Hx  Surg Hx  Fam Hx       Review of Systems   ROS COMP: MUSCULOSKELETAL: as noted above   NEURO: NEGATIVE for weakness or paresthesias  ENDOCRINE: POSITIVE  for HX thyroid disease    This document serves as a record of the services and decisions personally performed and made by James Radford MD. It was created on his behalf by Mckenna Oakley, a trained medical scribe. The creation of this document is based on the provider's statements to the medical scribe.  Mckenna Oakley 3:06 PM August 6, 2019      Objective    /78 (BP Location: Right arm, Patient Position: Chair, Cuff Size: Adult Regular)   Pulse 69   Temp 98.5  F (36.9  C) (Oral)   Resp 16   Ht 1.753 m (5' 9\")   Wt 82.6 kg (182 lb)   SpO2 98%   BMI 26.88 kg/m    Body mass index is 26.88 kg/m .  Physical Exam   GENERAL: healthy, alert and no distress  MS: Neck nontender and restriction to leftward flexion. Left shoulder appearance normal, range of motion normal.  Negative Neer's test, Negative empty can test, and Negative Doyle-Jerson.  NEURO: Normal strength and tone, sensory exam grossly normal and mentation intact    Diagnostic Test Results:  Labs reviewed in Epic  No results found for this or any previous visit (from the past 24 hour(s)).      " "Assessment & Plan     1. Hypothyroidism, unspecified type  - TSH with free T4 reflex  - Basic metabolic panel  - Magnesium    2. Pain in joint involving upper arm, left  Symptoms are somewhat consistent with cervical radiculopathy or cervical spinal stenosis with pain and weakness.  This is improving now so difficult to assess.  As this is improving we will have continued watchful waiting.  Consider EMG and cervical spine MRI if symptoms worsen or follow-up with sports medicine to help with evaluation.    3. Muscular fasciculation  - TSH with free T4 reflex  - Basic metabolic panel  - Magnesium       BMI:   Estimated body mass index is 26.88 kg/m  as calculated from the following:    Height as of this encounter: 1.753 m (5' 9\").    Weight as of this encounter: 82.6 kg (182 lb).   Weight management plan: Discussed healthy diet and exercise guidelines    Return in about 1 month (around 9/6/2019) for if not improving or sooner if worsening symptoms.    The information in this document, created by the medical scribe for me, accurately reflects the services I personally performed and the decisions made by me. I have reviewed and approved this document for accuracy prior to leaving the patient care area.  August 6, 2019 3:27 PM    James Radford MD  [facility]        "

## 2019-08-07 LAB
ANION GAP SERPL CALCULATED.3IONS-SCNC: 4 MMOL/L (ref 3–14)
BUN SERPL-MCNC: 18 MG/DL (ref 7–30)
CALCIUM SERPL-MCNC: 8.7 MG/DL (ref 8.5–10.1)
CHLORIDE SERPL-SCNC: 105 MMOL/L (ref 94–109)
CO2 SERPL-SCNC: 31 MMOL/L (ref 20–32)
CREAT SERPL-MCNC: 1.14 MG/DL (ref 0.66–1.25)
GFR SERPL CREATININE-BSD FRML MDRD: 71 ML/MIN/{1.73_M2}
GLUCOSE SERPL-MCNC: 77 MG/DL (ref 70–99)
MAGNESIUM SERPL-MCNC: 2.3 MG/DL (ref 1.6–2.3)
POTASSIUM SERPL-SCNC: 4.8 MMOL/L (ref 3.4–5.3)
SODIUM SERPL-SCNC: 140 MMOL/L (ref 133–144)
T4 FREE SERPL-MCNC: 0.95 NG/DL (ref 0.76–1.46)
TSH SERPL DL<=0.005 MIU/L-ACNC: 4.55 MU/L (ref 0.4–4)

## 2019-08-29 ENCOUNTER — TRANSFERRED RECORDS (OUTPATIENT)
Dept: HEALTH INFORMATION MANAGEMENT | Facility: CLINIC | Age: 54
End: 2019-08-29

## 2019-09-16 ENCOUNTER — TRANSFERRED RECORDS (OUTPATIENT)
Dept: HEALTH INFORMATION MANAGEMENT | Facility: CLINIC | Age: 54
End: 2019-09-16

## 2019-09-30 ENCOUNTER — OFFICE VISIT (OUTPATIENT)
Dept: FAMILY MEDICINE | Facility: CLINIC | Age: 54
End: 2019-09-30
Payer: COMMERCIAL

## 2019-09-30 ENCOUNTER — HEALTH MAINTENANCE LETTER (OUTPATIENT)
Age: 54
End: 2019-09-30

## 2019-09-30 VITALS
OXYGEN SATURATION: 98 % | SYSTOLIC BLOOD PRESSURE: 120 MMHG | HEIGHT: 69 IN | WEIGHT: 183 LBS | RESPIRATION RATE: 18 BRPM | HEART RATE: 68 BPM | TEMPERATURE: 98.6 F | BODY MASS INDEX: 27.11 KG/M2 | DIASTOLIC BLOOD PRESSURE: 72 MMHG

## 2019-09-30 DIAGNOSIS — E04.1 THYROID NODULE: Primary | ICD-10-CM

## 2019-09-30 DIAGNOSIS — Z23 NEED FOR PROPHYLACTIC VACCINATION AND INOCULATION AGAINST INFLUENZA: ICD-10-CM

## 2019-09-30 DIAGNOSIS — M48.02 CERVICAL STENOSIS OF SPINE: ICD-10-CM

## 2019-09-30 PROCEDURE — 90682 RIV4 VACC RECOMBINANT DNA IM: CPT | Performed by: FAMILY MEDICINE

## 2019-09-30 PROCEDURE — 90471 IMMUNIZATION ADMIN: CPT | Performed by: FAMILY MEDICINE

## 2019-09-30 PROCEDURE — 99213 OFFICE O/P EST LOW 20 MIN: CPT | Mod: 25 | Performed by: FAMILY MEDICINE

## 2019-09-30 ASSESSMENT — MIFFLIN-ST. JEOR: SCORE: 1650.46

## 2019-09-30 NOTE — PROGRESS NOTES
Subjective     Neymar Traore is a 54 year old male who presents to clinic today for the following health issues:    HPI     Patient here for follow-up of left arm pain and weakness. His cervical MRI showed degenerative changes with stenosis. He attends physical therapy. The spine specialist at [facility] recommended surgical treatment. He is hesitant about pursuing surgery and wonders if other treatment options are available.     History of hypothyroidism, on levothyroxine. The MRI performed through [facility] revealed a growth on his thyroid. He was recommended to have a follow-up ultrasound performed. Denies thyroid specific symptoms.     Patient Active Problem List   Diagnosis     BPH (benign prostatic hypertrophy)     CHERISE (obstructive sleep apnea)     Hypothyroidism     Other specified hypothyroidism     Pulmonary nodules     Cervical stenosis of spine     Past Surgical History:   Procedure Laterality Date     ARTHROSCOPY KNEE RT/LT      Numerous surgeries on Rt. Knee, R  Patella Fracture 1981     Microscopic Hematuria      Negative Cystoscopy and CT scan in 2009     Pulmonary Nodules      No change on repeat CT scan in 2009, no repeat imaging at this point     SEPTOPLASTY, TURBINOPLASTY, COMBINED N/A 12/17/2014    Procedure: COMBINED SEPTOPLASTY, TURBINOPLASTY;  Surgeon: Mingo Pelayo MD;  Location:  OR     SURGICAL HISTORY OF -       Lt. Elbow tendon surgery     TONSILLECTOMY       VASECTOMY         Social History     Tobacco Use     Smoking status: Never Smoker     Smokeless tobacco: Never Used     Tobacco comment: <1 year of smoking, <1ppd over 30 years ago   Substance Use Topics     Alcohol use: Yes     Alcohol/week: 0.0 standard drinks     Comment: 1-2 drinks per week-wine, has cut down     Family History   Problem Relation Age of Onset     Family History Negative Mother         alive 70     Thyroid Disease Mother         low thyroid     Family History Negative Father        "  alive 70     Family History Negative Sister         alive 40     Family History Negative Maternal Grandmother         alive-had ovarina cancer in her 50's age     Cerebrovascular Disease Maternal Grandmother      Diabetes Maternal Grandfather         late in life     Diabetes Paternal Grandmother      Cerebrovascular Disease Paternal Grandmother      Neurologic Disorder Paternal Grandmother         RLS     Family History Negative Paternal Grandfather      Cancer - colorectal No family hx of      Prostate Cancer No family hx of      C.A.D. No family hx of          Reviewed and updated as needed this visit by Provider  Tobacco  Allergies  Meds  Problems  Med Hx  Surg Hx  Fam Hx       Review of Systems   ROS COMP: MUSCULOSKELETAL: as noted above   NEURO: as noted above   ENDOCRINE: POSITIVE  for HX thyroid disease    This document serves as a record of the services and decisions personally performed and made by James Radford MD. It was created on his behalf by Mckenna Oakley, a trained medical scribe. The creation of this document is based on the provider's statements to the medical scribe.  Mckenna Oakley 10:23 AM September 30, 2019      Objective    /72 (BP Location: Right arm, Patient Position: Chair, Cuff Size: Adult Regular)   Pulse 68   Temp 98.6  F (37  C) (Oral)   Resp 18   Ht 1.753 m (5' 9\")   Wt 83 kg (183 lb)   SpO2 98%   BMI 27.02 kg/m    Body mass index is 27.02 kg/m .  Physical Exam   GENERAL: healthy, alert and no distress    Diagnostic Test Results:  Labs reviewed in Epic      Assessment & Plan     1. Thyroid nodule  Recommend ultrasound of thyroid with abnormal finding on MRI of 1.7 cm nodule.  - US Thyroid; Future    2. Cervical stenosis of spine  Discussed treatment options with patient.  He is seeing a spine clinic.  He is interested in getting a second opinion.  With his arm weakness and muscle fasciculations we discussed leaning towards surgical intervention versus continued " "conservative management based on those findings.    3. Need for prophylactic vaccination and inoculation against influenza  - INFLUENZA QUAD, RECOMBINANT, P-FREE (RIV4) (FLUBLOCK) [08340]  - Vaccine Administration, Initial [56876]       BMI:   Estimated body mass index is 27.02 kg/m  as calculated from the following:    Height as of this encounter: 1.753 m (5' 9\").    Weight as of this encounter: 83 kg (183 lb).   Weight management plan: Discussed healthy diet and exercise guidelines    Return in about 2 months (around 11/30/2019) for if not improving or sooner if worsening symptoms.    The information in this document, created by the medical scribe for me, accurately reflects the services I personally performed and the decisions made by me. I have reviewed and approved this document for accuracy prior to leaving the patient care area.  September 30, 2019 10:39 AM    James Radford MD  [facility]        "

## 2019-10-02 ENCOUNTER — HOSPITAL ENCOUNTER (OUTPATIENT)
Dept: ULTRASOUND IMAGING | Facility: CLINIC | Age: 54
Discharge: HOME | End: 2019-10-02
Attending: FAMILY MEDICINE | Admitting: FAMILY MEDICINE
Payer: COMMERCIAL

## 2019-10-02 DIAGNOSIS — E04.1 THYROID NODULE: ICD-10-CM

## 2019-10-02 PROCEDURE — 76536 US EXAM OF HEAD AND NECK: CPT

## 2019-10-03 ENCOUNTER — MYC MEDICAL ADVICE (OUTPATIENT)
Dept: FAMILY MEDICINE | Facility: CLINIC | Age: 54
End: 2019-10-03

## 2019-10-03 DIAGNOSIS — E04.1 THYROID NODULE: Primary | ICD-10-CM

## 2019-10-03 NOTE — TELEPHONE ENCOUNTER
See my chart and result note from PCP     As PCP is out of clinic until next week please sign order.      Endo is booking out several months.     Carin Murphy RN

## 2019-10-07 ENCOUNTER — ANCILLARY PROCEDURE (OUTPATIENT)
Dept: ULTRASOUND IMAGING | Facility: CLINIC | Age: 54
End: 2019-10-07
Attending: FAMILY MEDICINE
Payer: COMMERCIAL

## 2019-10-07 DIAGNOSIS — E04.1 THYROID NODULE: ICD-10-CM

## 2019-10-07 PROCEDURE — 10006 FNA BX W/US GDN EA ADDL: CPT | Performed by: RADIOLOGY

## 2019-10-07 PROCEDURE — 00000102 ZZHCL STATISTIC CYTO WRIGHT STAIN TC: Performed by: FAMILY MEDICINE

## 2019-10-07 PROCEDURE — 88173 CYTOPATH EVAL FNA REPORT: CPT | Performed by: FAMILY MEDICINE

## 2019-10-07 PROCEDURE — 10005 FNA BX W/US GDN 1ST LES: CPT | Performed by: RADIOLOGY

## 2019-10-07 RX ADMIN — Medication 1 MEQ: at 09:05

## 2019-10-07 NOTE — PROGRESS NOTES
Cali was seen in ultrasound today for a fine needle aspiration of two left thyroid nodules. Procedure was explained and consent was obtained. The entire neck was prepped and sterile drape was applied. 9 ml Lidocaine (NDC 2918-1521-39) with 1 ml 8.4% Na Bicarbonate (NDC 7942-7190-03) was used to anesthetize the skin and subcutaneous tissue. Procedure was performed without complications. Pain at start of procedure 0/10. Pain at end of procedure 0/10. Ice applied to left anterior neck for 5 minutes post procedure. Patient d/c with home care and follow-up instructions.  Procedure performed by: Dr. Boyer  Other staff present: DAVID Saleh and Caterina.

## 2019-10-08 LAB — COPATH REPORT: NORMAL

## 2019-10-09 ENCOUNTER — TELEPHONE (OUTPATIENT)
Dept: FAMILY MEDICINE | Facility: CLINIC | Age: 54
End: 2019-10-09

## 2019-10-09 DIAGNOSIS — E04.1 THYROID NODULE: Primary | ICD-10-CM

## 2019-10-09 NOTE — TELEPHONE ENCOUNTER
Discussed thyroid FNA with atypia of undetermined significance.  Discussed consideration for lobectomy versus reflux molecular testing on sample versus repeat FNA in 4 to 6 weeks.  Patient at this point deciding for repeat FNA.  At that point if AUS is seen again can consider lobectomy versus molecular testing.  Repeat FNA ordered.      B.  Thyroid, inferior left #2, ultrasound guided fine needle aspiration:   - Atypia of undetermined significance     Atypia of undetermined significance   The [place] implied risk of malignancy and recommended clinical   management:   Atypia of undetermined significance has a 10-30% risk of malignancy,   recommend repeat FNA in 4-6 weeks,   molecular testing or lobectomy

## 2019-10-28 ENCOUNTER — TRANSFERRED RECORDS (OUTPATIENT)
Dept: HEALTH INFORMATION MANAGEMENT | Facility: CLINIC | Age: 54
End: 2019-10-28

## 2019-11-04 ENCOUNTER — OFFICE VISIT (OUTPATIENT)
Dept: FAMILY MEDICINE | Facility: CLINIC | Age: 54
End: 2019-11-04
Payer: COMMERCIAL

## 2019-11-04 VITALS
OXYGEN SATURATION: 99 % | TEMPERATURE: 98.5 F | DIASTOLIC BLOOD PRESSURE: 72 MMHG | BODY MASS INDEX: 27.25 KG/M2 | SYSTOLIC BLOOD PRESSURE: 128 MMHG | WEIGHT: 184 LBS | HEIGHT: 69 IN | HEART RATE: 64 BPM | RESPIRATION RATE: 16 BRPM

## 2019-11-04 DIAGNOSIS — Z01.818 PREOP GENERAL PHYSICAL EXAM: Primary | ICD-10-CM

## 2019-11-04 DIAGNOSIS — E03.9 HYPOTHYROIDISM, UNSPECIFIED TYPE: ICD-10-CM

## 2019-11-04 DIAGNOSIS — E04.1 THYROID NODULE: ICD-10-CM

## 2019-11-04 DIAGNOSIS — M77.01 MEDIAL EPICONDYLITIS OF ELBOW, RIGHT: ICD-10-CM

## 2019-11-04 LAB
ERYTHROCYTE [DISTWIDTH] IN BLOOD BY AUTOMATED COUNT: 12.6 % (ref 10–15)
HCT VFR BLD AUTO: 37.9 % (ref 40–53)
HGB BLD-MCNC: 13 G/DL (ref 13.3–17.7)
MCH RBC QN AUTO: 31.4 PG (ref 26.5–33)
MCHC RBC AUTO-ENTMCNC: 34.3 G/DL (ref 31.5–36.5)
MCV RBC AUTO: 92 FL (ref 78–100)
PLATELET # BLD AUTO: 217 10E9/L (ref 150–450)
RBC # BLD AUTO: 4.14 10E12/L (ref 4.4–5.9)
WBC # BLD AUTO: 4.6 10E9/L (ref 4–11)

## 2019-11-04 PROCEDURE — 85027 COMPLETE CBC AUTOMATED: CPT | Performed by: FAMILY MEDICINE

## 2019-11-04 PROCEDURE — 99215 OFFICE O/P EST HI 40 MIN: CPT | Performed by: FAMILY MEDICINE

## 2019-11-04 PROCEDURE — 36415 COLL VENOUS BLD VENIPUNCTURE: CPT | Performed by: FAMILY MEDICINE

## 2019-11-04 ASSESSMENT — MIFFLIN-ST. JEOR: SCORE: 1655

## 2019-11-04 NOTE — PROGRESS NOTES
[facility]  [address] [place] [id number] [phone]  Dept: [phone]    PRE-OP EVALUATION:  Today's date: 2019    Neymar Traore (: 1965) presents for pre-operative evaluation assessment as requested by Dr. Conrad.  He requires evaluation and anesthesia risk assessment prior to undergoing surgery/procedure for treatment of right elbow .    Fax number for surgical facility: [phone]  Primary Physician: James Radford  Type of Anesthesia Anticipated: General    Patient has a Health Care Directive or Living Will:  YES     Preop Questions 2019   Who is doing your surgery? Dr. Malachi Conrad   What are you having done? [facility]   Date of Surgery/Procedure: 2019   Facility or Hospital where procedure/surgery will be performed: [facility]   1.  Do you have a history of Heart attack, stroke, stent, coronary bypass surgery, or other heart surgery? No   2.  Do you ever have any pain or discomfort in your chest? No   3.  Do you have a history of  Heart Failure? No   4.   Are you troubled by shortness of breath when:  walking on a level surface, or up a slight hill, or at night? No   5.  Do you currently have a cold, bronchitis or other respiratory infection? No   6.  Do you have a cough, shortness of breath, or wheezing? No   7.  Do you sometimes get pains in the calves of your legs when you walk? No   8. Do you or anyone in your family have previous history of blood clots? No   9.  Do you or does anyone in your family have a serious bleeding problem such as prolonged bleeding following surgeries or cuts? No   10. Have you ever had problems with anemia or been told to take iron pills? No   11. Have you had any abnormal blood loss such as black, tarry or bloody stools? No   12. Have you ever had a blood transfusion? No   13. Have you or any of your relatives ever had problems with anesthesia? No   14. Do you have sleep apnea, excessive snoring or  daytime drowsiness? YES - all of the above, reported history of sleep apnea currently not using a CPAP machine.  Reason not specified.   15. Do you have any prosthetic heart valves? No   16. Do you have prosthetic joints? No       HPI:     HPI related to upcoming procedure: Patient is a pleasant 54-year-old male,  who presents the clinic for preoperative evaluation of right medial epicondylitis.  Chronic issue.  Refractory to medical management.  Currently follows with [facility] orthopedics will be performing a release surgery.    HYPOTHYROIDISM - Patient has a longstanding history of chronic Hypothyroidism. Patient has been doing well, noting no tremor, insomnia, hair loss or changes in skin texture.  He does report unintentional weight gain.  Continues to take medications as directed, without adverse reactions or side effects. Last TSH     Lab Results   Component Value Date    TSH 4.55 (H) 08/06/2019       MEDICAL HISTORY:     Patient Active Problem List    Diagnosis Date Noted     Cervical stenosis of spine 09/30/2019     Priority: Medium     Pulmonary nodules 11/05/2016     Priority: Medium     Other specified hypothyroidism 10/21/2015     Priority: Medium     Hypothyroidism 04/03/2015     Priority: Medium     CHERISE (obstructive sleep apnea) 09/17/2014     Priority: Medium     BPH (benign prostatic hypertrophy) 06/29/2011     Priority: Medium      Past Medical History:   Diagnosis Date     BPH (benign prostatic hyperplasia)      Sleep apnea     no CPAP     Thyroid disease      Past Surgical History:   Procedure Laterality Date     ARTHROSCOPY KNEE RT/LT      Numerous surgeries on Rt. Knee, R  Patella Fracture 1981     Microscopic Hematuria      Negative Cystoscopy and CT scan in 2009     Pulmonary Nodules      No change on repeat CT scan in 2009, no repeat imaging at this point     SEPTOPLASTY, TURBINOPLASTY, COMBINED N/A 12/17/2014    Procedure: COMBINED SEPTOPLASTY, TURBINOPLASTY;  Surgeon:  "Mingo Pelayo MD;  Location: [facility] OR     SURGICAL HISTORY OF -       Lt. Elbow tendon surgery     TONSILLECTOMY       VASECTOMY       Current Outpatient Medications   Medication Sig Dispense Refill     cetirizine (ZYRTEC) 10 MG tablet Take 10 mg by mouth daily  30 tablet 1     levothyroxine (SYNTHROID/LEVOTHROID) 150 MCG tablet TAKE 1 TABLET (150 MCG) BY MOUTH DAILY 90 tablet 3     OTC products: NSAIDS. Excedrin and Ibuprofen.     Allergies   Allergen Reactions     Codeine      Nausea and Vomiting      Latex Allergy: NO    Social History     Tobacco Use     Smoking status: Never Smoker     Smokeless tobacco: Never Used     Tobacco comment: <1 year of smoking, <1ppd over 30 years ago   Substance Use Topics     Alcohol use: Yes     Alcohol/week: 0.0 standard drinks     Comment: 1-2 drinks per week-wine, has cut down     History   Drug Use No       REVIEW OF SYSTEMS:   Constitutional, neuro, ENT, endocrine, pulmonary, cardiac, gastrointestinal, genitourinary, musculoskeletal, integument and psychiatric systems are negative, except as otherwise noted.    EXAM:   /72 (BP Location: Right arm, Patient Position: Chair, Cuff Size: Adult Regular)   Pulse 64   Temp 98.5  F (36.9  C) (Oral)   Resp 16   Ht 1.753 m (5' 9\")   Wt 83.5 kg (184 lb)   SpO2 99%   BMI 27.17 kg/m      GENERAL APPEARANCE: healthy, alert and no distress     EYES: EOMI,  PERRL     HENT: ear canals and TM's normal and nose and mouth without ulcers or lesions     NECK: no adenopathy, no asymmetry, masses, or scars and thyroid normal to palpation     RESP: lungs clear to auscultation - no rales, rhonchi or wheezes     CV: regular rates and rhythm, normal S1 S2, no S3 or S4 and no murmur, click or rub     ABDOMEN:  soft, nontender, no HSM or masses and bowel sounds normal     MS: extremities normal- no gross deformities noted, no evidence of inflammation in joints, FROM in all extremities.     SKIN: no suspicious lesions or rashes     NEURO: " Normal strength and tone, sensory exam grossly normal, mentation intact and speech normal     PSYCH: mentation appears normal. and affect normal/bright     LYMPHATICS: No cervical adenopathy    DIAGNOSTICS:     EKG: Not indicated due to non-vascular surgery and low risk of event (age <65 and without cardiac risk factors)  Labs Drawn and in Process:   Unresulted Labs Ordered in the Past 30 Days of this Admission     Date and Time Order Name Status Description    11/4/2019 1148 CBC WITH PLATELETS In process           Recent Labs   Lab Test 08/06/19  1539 12/19/18  0919 05/05/17  1449   HGB  --  13.4 13.2*   PLT  --  211 242    139 142   POTASSIUM 4.8 4.2 3.8   CR 1.14 1.11 1.10      TSH   Date Value Ref Range Status   08/06/2019 4.55 (H) 0.40 - 4.00 mU/L Final   12/19/2018 1.94 0.40 - 4.00 mU/L Final   04/04/2018 2.83 0.40 - 4.00 mU/L Final   12/26/2017 0.20 (L) 0.40 - 4.00 mU/L Final   10/23/2017 4.67 (H) 0.40 - 4.00 mU/L Final       FNA: 10-7-2019  Thyroid, inferior left #2, ultrasound guided fine needle aspiration:   - Atypia of undetermined significance     Atypia of undetermined significance   The [place] implied risk of malignancy and recommended clinical   management:     Atypia of undetermined significance has a 10-30% risk of malignancy,   recommend repeat FNA in 4-6 weeks,   molecular testing or lobectomy     IMPRESSION:   Reason for surgery/procedure: Right medial epicondylitis, refractory to medical management.   Diagnosis/reason for consult: Pre-operative evaluation    The proposed surgical procedure is considered INTERMEDIATE risk.    REVISED CARDIAC RISK INDEX  The patient has the following serious cardiovascular risks for perioperative complications such as (MI, PE, VFib and 3  AV Block):  No serious cardiac risks  INTERPRETATION: 0 risks: Class I (very low risk - 0.4% complication rate)    The patient has the following additional risks for perioperative complications:  No identified additional  risks      ICD-10-CM    1. Preop general physical exam Z01.818 CBC with platelets   2. Medial epicondylitis of elbow, right M77.01    3. Hypothyroidism, unspecified type E03.9    4. Thyroid nodule E04.1        RECOMMENDATIONS:       Obstructive Sleep Apnea (or suspected sleep apnea)  Hospital staff are advised to monitor for sleep related oxygen desaturations due to suspicion of CHERISE    Hypothyroidism, Thyroid Nodule  Subtherapeutic, take Synthroid on day of surgery with small sip of water.  Schedule follow-up with PCP for Synthroid titration.  Dose not need to be adjusted prior to surgery as his vitals are normal and currently has a normal neurological exam.  Repeat FNA for thyroid nodule atypia, already planned.    --Patient is to take all scheduled medications on the day of surgery EXCEPT for modifications listed below.    Anticoagulant or Antiplatelet Medication Use  NSAIDS: Ibuprofen (Motrin):         Stop one day prior to surgery to decrease risk of perioperative bleeding.        APPROVAL GIVEN to proceed with proposed procedure, without further diagnostic evaluation       Signed Electronically by: Aki Sow MD    Copy of this evaluation report is provided to requesting physician.    [place] Preop Guidelines    Revised Cardiac Risk Index

## 2019-11-25 ENCOUNTER — HOSPITAL ENCOUNTER (OUTPATIENT)
Dept: ULTRASOUND IMAGING | Facility: CLINIC | Age: 54
Discharge: HOME | End: 2019-11-25
Attending: FAMILY MEDICINE | Admitting: FAMILY MEDICINE
Payer: COMMERCIAL

## 2019-11-25 DIAGNOSIS — E04.1 THYROID NODULE: ICD-10-CM

## 2019-11-25 PROCEDURE — 88173 CYTOPATH EVAL FNA REPORT: CPT | Mod: 26 | Performed by: PHYSICIAN ASSISTANT

## 2019-11-25 PROCEDURE — 10005 FNA BX W/US GDN 1ST LES: CPT

## 2019-11-25 PROCEDURE — 25000125 ZZHC RX 250: Performed by: PHYSICIAN ASSISTANT

## 2019-11-25 PROCEDURE — 00000102 ZZHCL STATISTIC CYTO WRIGHT STAIN TC: Performed by: PHYSICIAN ASSISTANT

## 2019-11-25 PROCEDURE — 88173 CYTOPATH EVAL FNA REPORT: CPT | Performed by: PHYSICIAN ASSISTANT

## 2019-11-25 RX ORDER — LIDOCAINE HYDROCHLORIDE 10 MG/ML
10 INJECTION, SOLUTION EPIDURAL; INFILTRATION; INTRACAUDAL; PERINEURAL ONCE
Status: COMPLETED | OUTPATIENT
Start: 2019-11-25 | End: 2019-11-25

## 2019-11-25 RX ADMIN — LIDOCAINE HYDROCHLORIDE 4 ML: 10 INJECTION, SOLUTION EPIDURAL; INFILTRATION; INTRACAUDAL; PERINEURAL at 11:00

## 2019-11-25 NOTE — PROGRESS NOTES
Consent for Left Thyroid Nodule FNA obtained by Bridget AGUILAR .  Pt tolerated procedure well.  Pressure to site then drsg applied.  This was a repeat of Left Thyroid Nodule #2.  Previous biopsy inconclusive for this nodule.  Specimen sent to lab.  States understanding of post site care instructions.  Discharge home ambulatory with no evident bleeding or complications.

## 2019-11-25 NOTE — PROCEDURES
[facility]    Procedure: Thyroid FNA biopsy  Date/Time: 11/25/2019 10:57 AM  Performed by: Bridget Royal PA-C  Authorized by: Bridget Royal PA-C     UNIVERSAL PROTOCOL   Site Marked: Yes  Prior Images Obtained and Reviewed:  Yes  Required items: Required blood products, implants, devices and special equipment available    Patient identity confirmed:  Verbally with patient  NA - No sedation, light sedation, or local anesthesia  Confirmation Checklist:  Patient's identity using two indicators, relevant allergies, procedure was appropriate and matched the consent or emergent situation and correct equipment/implants were available  Time out: Immediately prior to the procedure a time out was called    Preparation: Patient was prepped and draped in usual sterile fashion    ESBL (mL):  0     ANESTHESIA    Anesthesia: Local infiltration  Local Anesthetic:  Lidocaine 1% without epinephrine  Anesthetic Total (mL):  4      SEDATION    Patient Sedated: No    See dictated procedure note for full details.  PROCEDURE   Patient Tolerance:  Patient tolerated the procedure well with no immediate complications  Describe Procedure: Repeat fine needle aspiration of left lower lobe nodule (#2). Prior biopsy of this nodule at outside facility was inconclusive.   Length of time physician/provider present for 1:1 monitoring during sedation: 0

## 2019-11-26 PROBLEM — E04.1 THYROID NODULE: Status: ACTIVE | Noted: 2019-11-26

## 2019-11-26 LAB — COPATH REPORT: NORMAL

## 2019-12-03 ENCOUNTER — OFFICE VISIT (OUTPATIENT)
Dept: FAMILY MEDICINE | Facility: CLINIC | Age: 54
End: 2019-12-03
Payer: COMMERCIAL

## 2019-12-03 VITALS
OXYGEN SATURATION: 97 % | SYSTOLIC BLOOD PRESSURE: 134 MMHG | DIASTOLIC BLOOD PRESSURE: 70 MMHG | RESPIRATION RATE: 17 BRPM | WEIGHT: 184 LBS | BODY MASS INDEX: 27.25 KG/M2 | HEIGHT: 69 IN | HEART RATE: 60 BPM

## 2019-12-03 DIAGNOSIS — D64.9 LOW HEMOGLOBIN: Primary | ICD-10-CM

## 2019-12-03 DIAGNOSIS — E03.9 HYPOTHYROIDISM, UNSPECIFIED TYPE: ICD-10-CM

## 2019-12-03 DIAGNOSIS — E04.1 THYROID NODULE: ICD-10-CM

## 2019-12-03 LAB
BASOPHILS # BLD AUTO: 0 10E9/L (ref 0–0.2)
BASOPHILS NFR BLD AUTO: 0.2 %
DIFFERENTIAL METHOD BLD: NORMAL
EOSINOPHIL # BLD AUTO: 0.2 10E9/L (ref 0–0.7)
EOSINOPHIL NFR BLD AUTO: 3.7 %
ERYTHROCYTE [DISTWIDTH] IN BLOOD BY AUTOMATED COUNT: 12.7 % (ref 10–15)
HCT VFR BLD AUTO: 41.1 % (ref 40–53)
HGB BLD-MCNC: 14.1 G/DL (ref 13.3–17.7)
LYMPHOCYTES # BLD AUTO: 1.8 10E9/L (ref 0.8–5.3)
LYMPHOCYTES NFR BLD AUTO: 35 %
MCH RBC QN AUTO: 31.8 PG (ref 26.5–33)
MCHC RBC AUTO-ENTMCNC: 34.3 G/DL (ref 31.5–36.5)
MCV RBC AUTO: 93 FL (ref 78–100)
MONOCYTES # BLD AUTO: 0.6 10E9/L (ref 0–1.3)
MONOCYTES NFR BLD AUTO: 10.8 %
NEUTROPHILS # BLD AUTO: 2.6 10E9/L (ref 1.6–8.3)
NEUTROPHILS NFR BLD AUTO: 50.3 %
PLATELET # BLD AUTO: 227 10E9/L (ref 150–450)
RBC # BLD AUTO: 4.43 10E12/L (ref 4.4–5.9)
VIT B12 SERPL-MCNC: 398 PG/ML (ref 193–986)
WBC # BLD AUTO: 5.1 10E9/L (ref 4–11)

## 2019-12-03 PROCEDURE — 85025 COMPLETE CBC W/AUTO DIFF WBC: CPT | Performed by: FAMILY MEDICINE

## 2019-12-03 PROCEDURE — 84439 ASSAY OF FREE THYROXINE: CPT | Performed by: FAMILY MEDICINE

## 2019-12-03 PROCEDURE — 36415 COLL VENOUS BLD VENIPUNCTURE: CPT | Performed by: FAMILY MEDICINE

## 2019-12-03 PROCEDURE — 82607 VITAMIN B-12: CPT | Performed by: FAMILY MEDICINE

## 2019-12-03 PROCEDURE — 84443 ASSAY THYROID STIM HORMONE: CPT | Performed by: FAMILY MEDICINE

## 2019-12-03 PROCEDURE — 84403 ASSAY OF TOTAL TESTOSTERONE: CPT | Performed by: FAMILY MEDICINE

## 2019-12-03 PROCEDURE — 82728 ASSAY OF FERRITIN: CPT | Performed by: FAMILY MEDICINE

## 2019-12-03 PROCEDURE — 83550 IRON BINDING TEST: CPT | Performed by: FAMILY MEDICINE

## 2019-12-03 PROCEDURE — 99214 OFFICE O/P EST MOD 30 MIN: CPT | Performed by: FAMILY MEDICINE

## 2019-12-03 PROCEDURE — 83540 ASSAY OF IRON: CPT | Performed by: FAMILY MEDICINE

## 2019-12-03 ASSESSMENT — MIFFLIN-ST. JEOR: SCORE: 1655

## 2019-12-03 NOTE — PROGRESS NOTES
"Subjective     Patient presents with:  Thyroid Problem    Neymar Traore is a 54 year old male who presents to clinic today for the following health issues:    Patient with history of hypothyroidism over several years duration.  Has family history of Hashimoto's thyroiditis as well.  Likely Hashimoto's though did not have antibody testing in the past.    Patient had thyroid nodules noted on an ultrasound after incidental finding on prior CT.  Biopsies show benign.  Consider recheck in 1 year to make sure no changes.    Patient incidentally with slightly low hemoglobin from his preop recently.  Denies any blood in stool.    Reviewed and updated as needed this visit by Provider         ROS:  ENDOCRINE: NEGATIVE for temperature intolerance, skin/hair changes  HEME/ALLERGY/IMMUNE: NEGATIVE for bleeding problems        Objective    /70 (BP Location: Right arm, Patient Position: Chair, Cuff Size: Adult Regular)   Pulse 60   Resp 17   Ht 1.753 m (5' 9\")   Wt 83.5 kg (184 lb)   SpO2 97%   BMI 27.17 kg/m    Body mass index is 27.17 kg/m .  Physical Exam   GENERAL APPEARANCE: healthy, alert and no distress      Assessment & Plan   1. Low hemoglobin  Recheck lab, unclear cause.  - Vitamin B12  - CBC with platelets differential  - Ferritin  - Iron and iron binding capacity    2. Hypothyroidism, unspecified type  If TSH elevated we may have an alternating schedule as he was overtreated on 175 mcg daily.  Consider taking 2 tabs of the 150 once per week.  - TSH with free T4 reflex  - Testosterone, total    3. Thyroid nodule  +Consider recheck ultrasound in 1 year.    James Radford MD  [facility]  No follow-ups on file.    "

## 2019-12-04 LAB
FERRITIN SERPL-MCNC: 244 NG/ML (ref 26–388)
IRON SATN MFR SERPL: 31 % (ref 15–46)
IRON SERPL-MCNC: 82 UG/DL (ref 35–180)
T4 FREE SERPL-MCNC: 1.09 NG/DL (ref 0.76–1.46)
TIBC SERPL-MCNC: 265 UG/DL (ref 240–430)
TSH SERPL DL<=0.005 MIU/L-ACNC: 5.54 MU/L (ref 0.4–4)

## 2019-12-05 ENCOUNTER — TRANSFERRED RECORDS (OUTPATIENT)
Dept: HEALTH INFORMATION MANAGEMENT | Facility: CLINIC | Age: 54
End: 2019-12-05

## 2019-12-05 LAB — TESTOST SERPL-MCNC: 337 NG/DL (ref 240–950)

## 2019-12-19 ENCOUNTER — TRANSFERRED RECORDS (OUTPATIENT)
Dept: HEALTH INFORMATION MANAGEMENT | Facility: CLINIC | Age: 54
End: 2019-12-19

## 2019-12-27 ENCOUNTER — TRANSFERRED RECORDS (OUTPATIENT)
Dept: HEALTH INFORMATION MANAGEMENT | Facility: CLINIC | Age: 54
End: 2019-12-27

## 2020-01-08 DIAGNOSIS — E03.9 HYPOTHYROIDISM, UNSPECIFIED TYPE: ICD-10-CM

## 2020-01-08 RX ORDER — LEVOTHYROXINE SODIUM 150 UG/1
TABLET ORAL
Qty: 30 TABLET | Refills: 11 | Status: SHIPPED | OUTPATIENT
Start: 2020-01-08 | End: 2020-10-06

## 2020-01-08 NOTE — TELEPHONE ENCOUNTER
Mychart sent to see if pt is in agreement with recent recommendation changes per result note.  Pt read the result note but did not respond    Gabi Brothers RN, BSN

## 2020-01-08 NOTE — TELEPHONE ENCOUNTER
Per pt:  I have tried alternating in a higher dosage. Unfortunately   , I don't like the side effects when I take the higher dosage. So I'm sticking with my current 150mg.

## 2020-01-23 ENCOUNTER — TRANSFERRED RECORDS (OUTPATIENT)
Dept: HEALTH INFORMATION MANAGEMENT | Facility: CLINIC | Age: 55
End: 2020-01-23

## 2020-03-21 ENCOUNTER — TRANSFERRED RECORDS (OUTPATIENT)
Dept: HEALTH INFORMATION MANAGEMENT | Facility: CLINIC | Age: 55
End: 2020-03-21

## 2020-03-22 ENCOUNTER — HEALTH MAINTENANCE LETTER (OUTPATIENT)
Age: 55
End: 2020-03-22

## 2020-04-30 ENCOUNTER — E-VISIT (OUTPATIENT)
Dept: FAMILY MEDICINE | Facility: CLINIC | Age: 55
End: 2020-04-30
Payer: COMMERCIAL

## 2020-04-30 DIAGNOSIS — R30.0 DYSURIA: Primary | ICD-10-CM

## 2020-04-30 PROCEDURE — 99421 OL DIG E/M SVC 5-10 MIN: CPT | Performed by: FAMILY MEDICINE

## 2020-04-30 RX ORDER — FLUCONAZOLE 150 MG/1
150 TABLET ORAL ONCE
Qty: 1 TABLET | Refills: 0 | Status: SHIPPED | OUTPATIENT
Start: 2020-04-30 | End: 2020-05-29

## 2020-04-30 NOTE — TELEPHONE ENCOUNTER
Provider E-Visit time total (minutes): 5 min    1. Dysuria  Symptoms currently reported a genital fungal infection that cleared after over-the-counter antifungal.  May be having symptoms related to fungal-urethral translocation.  Prescription given for empiric treatment of fungal infection exacerbated urethritis; 1 tablet fluconazole 150 mg x 1.  Return if symptoms are not improved after 3 days.  - fluconazole (DIFLUCAN) 150 MG tablet; Take 1 tablet (150 mg) by mouth once for 1 dose  Dispense: 1 tablet; Refill: 0       Aki Sow MD  [facility]

## 2020-04-30 NOTE — PATIENT INSTRUCTIONS
Thank you for choosing us for your care. I have placed an order for a prescription so that you can start treatment. View your full visit summary for details by clicking on the link below. Your pharmacist will able to address any questions you may have about the medication.     If you re not feeling better within 2-3 days, please schedule an appointment.  You can schedule an appointment right here in [facility], or call [phone]  If the visit is for the same symptoms as your e-visit, we ll refund the cost of your e-visit if seen within seven days.      Dysuria with Uncertain Cause (Adult)    The urethra is the tube that allows urine to pass out of the body. In a woman, the urethra is the opening above the vagina. In men, the urethra is the opening on the tip of the penis. Dysuria is the feeling of pain or burning in the urethra when passing urine.  Dysuria can be caused by anything that irritates or inflames the urethra. An infection or chemical irritation can cause this reaction. A bladder infection is the most common cause of dysuria in adults. A urine test can diagnose this. A bladder infection needs antibiotic treatment.  Soaps, lotions, colognes and feminine hygiene products can cause dysuria. So can birth control jellies, creams, and foams. It will go away 1 to 3 days after using these irritants.  Sexually transmitted diseases (STDs) such as chlamydia or gonorrhea can cause dysuria. Your healthcare provider may take a culture sample. Your provider may start you on antibiotic medicine before the culture test returns.  In women who have gone through menopause, dysuria can be from dryness in the lining of the urethra. This can be treated with hormones. Dysuria becomes long-term (chronic) when it lasts for weeks or months. You may need to see a specialist (urologist) to diagnose and treat chronic dysuria.  Home care  These home care tips may help:    Don't use any chemicals or products that you think may be  causing your symptoms.    If you were given a prescription medicine, take as directed. Be sure to take it until it is all used up.    If a culture was taken, don't have sex until you have been told that it is negative. This means you don't have an infection. Then follow your healthcare provider's advice to treat your condition.  If a culture was done and it is positive:    Both you and your sexual partner may need to be treated. This is true even if your partner has no symptoms.    Contact your healthcare provider or go to an urgent care clinic or the public health department to be looked at and treated.    Don't have sex until both you and your partner(s) have finished all antibiotics and your healthcare provider says you are no longer contagious.    Learn about and use safe sex practices. The safest sex is with a partner who has tested negative and only has sex with you. Condoms can prevent STDs from spreading, but they aren't a guarantee.  Follow-up care  Follow up with your healthcare provider, or as advised. If a culture was taken, you may call as directed for the results. If you have an STD, follow up with your provider or the public health department for a complete STD screening, including HIV testing. For more information, contact CDC-INFO at 988-944-7063.  When to seek medical advice  Call your healthcare provider right away if any of these occur:    You aren't better after 3 days of treatment    Fever of 100.4 F (38 C) or higher, or as directed by your healthcare provider    Back or belly pain that gets worse    You can't urinate because of pain    New discharge from the urethra, vagina, or penis    Painful sores on the penis    Rash or joint pain    Painful lumps (lymph nodes) in the groin    Testicle pain or swelling of the scrotum  Date Last Reviewed: 11/1/2016 2000-2019 [employer]. [address] [place] [address], [place] [address]. All rights reserved. This information is not intended as a  substitute for professional medical care. Always follow your healthcare professional's instructions.

## 2020-05-29 ENCOUNTER — VIRTUAL VISIT (OUTPATIENT)
Dept: FAMILY MEDICINE | Facility: CLINIC | Age: 55
End: 2020-05-29
Payer: COMMERCIAL

## 2020-05-29 DIAGNOSIS — N40.1 BENIGN PROSTATIC HYPERPLASIA WITH LOWER URINARY TRACT SYMPTOMS, SYMPTOM DETAILS UNSPECIFIED: ICD-10-CM

## 2020-05-29 DIAGNOSIS — N40.1 BENIGN PROSTATIC HYPERPLASIA WITH LOWER URINARY TRACT SYMPTOMS, SYMPTOM DETAILS UNSPECIFIED: Primary | ICD-10-CM

## 2020-05-29 LAB
ALBUMIN UR-MCNC: NEGATIVE MG/DL
APPEARANCE UR: CLEAR
BILIRUB UR QL STRIP: NEGATIVE
COLOR UR AUTO: YELLOW
GLUCOSE UR STRIP-MCNC: NEGATIVE MG/DL
HGB UR QL STRIP: NEGATIVE
KETONES UR STRIP-MCNC: NEGATIVE MG/DL
LEUKOCYTE ESTERASE UR QL STRIP: NEGATIVE
NITRATE UR QL: NEGATIVE
PH UR STRIP: 5.5 PH (ref 5–7)
RBC #/AREA URNS AUTO: NORMAL /HPF
SOURCE: NORMAL
SP GR UR STRIP: 1.02 (ref 1–1.03)
UROBILINOGEN UR STRIP-ACNC: 0.2 EU/DL (ref 0.2–1)
WBC #/AREA URNS AUTO: NORMAL /HPF

## 2020-05-29 PROCEDURE — 81001 URINALYSIS AUTO W/SCOPE: CPT | Performed by: FAMILY MEDICINE

## 2020-05-29 PROCEDURE — 99213 OFFICE O/P EST LOW 20 MIN: CPT | Mod: GT | Performed by: FAMILY MEDICINE

## 2020-05-29 RX ORDER — TAMSULOSIN HYDROCHLORIDE 0.4 MG/1
0.4 CAPSULE ORAL AT BEDTIME
Qty: 30 CAPSULE | Refills: 1 | Status: SHIPPED | OUTPATIENT
Start: 2020-05-29 | End: 2020-07-05

## 2020-05-29 ASSESSMENT — ENCOUNTER SYMPTOMS
COLOR CHANGE: 0
DYSURIA: 0
FREQUENCY: 1
WOUND: 0

## 2020-05-29 NOTE — PROGRESS NOTES
"Neymar Traore is a 55 year old male who is being evaluated via a billable video visit.      The patient has been notified of following:     \"This video visit will be conducted via a call between you and your physician/provider. We have found that certain health care needs can be provided without the need for an in-person physical exam.  This service lets us provide the care you need with a video conversation.  If a prescription is necessary we can send it directly to your pharmacy.  If lab work is needed we can place an order for that and you can then stop by our lab to have the test done at a later time.    Video visits are billed at different rates depending on your insurance coverage.  Please reach out to your insurance provider with any questions.    If during the course of the call the physician/provider feels a video visit is not appropriate, you will not be charged for this service.\"    Patient has given verbal consent for Video visit? Yes    How would you like to obtain your AVS? Jazz    Patient would like the video invitation sent by: Send to e-mail at: [email or web address]    Will anyone else be joining your video visit? No      Subjective     Neymar Traore is a 55 year old male who presents today via video visit for the following health issues:    HPI  Genitourinary - Male  Onset: pressure between anus and scrotum. Discuss blood work     Description:   Dysuria (painful urination): YES- more discomfort and worse after void. Only at night  Hematuria (blood in urine): no   Frequency: no   Are you urinating at night : YES  Hesitancy (delay in urine): YES- sometimes and thinks he finished voiding and will get some leaking   Retention (unable to empty): YES- rough start   Decrease in urinary flow: YES  Incontinence: no     Progression of Symptoms:  same    Accompanying Signs & Symptoms:  Fever: no   Back/Flank pain: YES, sometimes over the years.   Urethral discharge: no   Testicle lumps/masses/pain: no " "  Nausea and/or vomiting: no   Abdominal pain: no     History:   History of frequent UTI's: no   History of kidney stones: no   History of hernias: YES- possible undiagnosed hernia   Personal or Family history of Prostate problems: no  Sexually active: YES    Precipitating factors:       Alleviating factors:    Frequent nighttime urination, gets up at least 4 times a night.  Feels incomplete evacuation, after urinating, feels like he can go again.  No painful urination however it feels some pressure inside, recently had a cystoscopy and was told this was related to BPH.  No reported history of urethral stricture.  States that urine stream is usually straight however it can spray to all different directions.    Tried Flomax a couple years ago however did not tolerate the medication due to some dizziness.  He is agreeable to try again.    Video Start Time: 10:06 AM      Reviewed and updated as needed this visit by Provider         Review of Systems   Genitourinary: Positive for frequency. Negative for decreased urine volume and dysuria.   Skin: Negative for color change, rash and wound.            Objective    There were no vitals taken for this visit.  Estimated body mass index is 27.17 kg/m  as calculated from the following:    Height as of 12/3/19: 1.753 m (5' 9\").    Weight as of 12/3/19: 83.5 kg (184 lb).  Physical Exam  Vitals signs reviewed.   Constitutional:       Appearance: He is not ill-appearing.   Neurological:      Mental Status: He is alert.   Psychiatric:         Mood and Affect: Mood normal.         Behavior: Behavior normal.          Diagnostic Test Results:  Labs reviewed in Epic        Assessment & Plan     1. Benign prostatic hyperplasia with lower urinary tract symptoms, symptom details unspecified  Restart Flomax.  UA today without evidence of urinary tract infection.  Deviously did not tolerate Flomax, tries to get at night.  I did discuss that if he is not tolerating his medication, there are " other management options including behavioral modifications and even other alternative therapy such as Proscar.  - tamsulosin (FLOMAX) 0.4 MG capsule; Take 1 capsule (0.4 mg) by mouth At Bedtime  Dispense: 30 capsule; Refill: 1  - UA with Microscopic reflex to Culture; Future      Return in about 1 week (around 6/5/2020) for If symptoms do not improve or gets worse..    Aki Sow MD  [facility]      Video-Visit Details    Type of service:  Video Visit    Video End Time:10:28 AM    Originating Location (pt. Location): Home    Distant Location (provider location):  [facility]     Platform used for Video Visit: AmWell    Return in about 1 week (around 6/5/2020) for If symptoms do not improve or gets worse..       Aki Sow MD

## 2020-06-25 ENCOUNTER — MYC MEDICAL ADVICE (OUTPATIENT)
Dept: FAMILY MEDICINE | Facility: CLINIC | Age: 55
End: 2020-06-25

## 2020-06-26 VITALS — BODY MASS INDEX: 25.77 KG/M2 | HEIGHT: 70 IN | WEIGHT: 180 LBS

## 2020-06-26 ASSESSMENT — MIFFLIN-ST. JEOR: SCORE: 1647.72

## 2020-06-26 NOTE — PROGRESS NOTES
"Neymar Traore is a 55 year old male who is being evaluated via a billable video visit.      The patient has been notified of following:     \"This video visit will be conducted via a call between you and your physician/provider. We have found that certain health care needs can be provided without the need for an in-person physical exam.  This service lets us provide the care you need with a video conversation.  If a prescription is necessary we can send it directly to your pharmacy.  If lab work is needed we can place an order for that and you can then stop by our lab to have the test done at a later time.    Video visits are billed at different rates depending on your insurance coverage.  Please reach out to your insurance provider with any questions.    If during the course of the call the physician/provider feels a video visit is not appropriate, you will not be charged for this service.\"    Patient has given verbal consent for Video visit? Yes  How would you like to obtain your AVS? MyChart    Will anyone else be joining your video visit? No      Elayne Morales MA on 6/26/2020 at 5:14 PM      Video-Visit Details    Type of service:  Video Visit    Video Start Time: 1:18 PM  Video End Time: 1:38 PM    Originating Location (pt. Location): Home    Distant Location (provider location):  [facility]     Platform used for Video Visit: PubMatic      Sleep Consultation:    Date on this visit: 6/29/2020    Neymar Traore is sent by No ref. provider found for a sleep consultation.    Primary Physician: James Radford     Chief Complaint   Patient presents with     Sleep Problem     Wants to start cpap        Neymar Traore was initially seen at the [facility] for concerns for sleep apnea given recently snorting observed by wife over the last year. He wonders if it might be contributing to possible fibromyalgia pain. He also has occasional restless leg symptoms  AHI was 20.3, with " moderate desaturations down to 84%. RDI 24.5. His apnea was predominant while supine. He spent about 1/3 of the baseline supine and 76% of his breathing events occurred during that time. Periodic Limb Movement Index 13/hour, none with CPAP.  When first trying CPAP, he was taking it off in his sleep and did not notice much benefit. He was on pressures 8-11 cm with nasal and nasal pillows masks. He eventually quit using it. He is getting more sleepy now. He had a surgery to reduce nasal turbinates and correct his septum. He was doing great for a month without snoring, but it returned. His snoring is variable depending on alcohol or how tired he is. He travels a lot. He does not think he would tolerate a dental appliance. He inquired about travel CPAPs. He is also not interested in further surgery.    LOV in 2016 with Bennett Goltz, PA-C. Reinitiated auto-CPAP 8-12 cm/H2O.  He picked up CPAP, was taking it off, so he returned the machine. He wants to obtain a travel CPAP via [employer] and has been dealing with them.    Neymar goes to sleep at 11:00 PM during the week. He wakes up at 5:00- 8:00 AM with an alarm. He falls asleep in 5 minutes.  Neymar denies difficulty falling asleep.  He wakes up 1-2 times a night for 60 minutes before falling back to sleep.  Neymar wakes up to go to the bathroom.  On weekends, Neymar goes to sleep at 11:00 PM.  He wakes up at 8:00 AM without an alarm. He falls asleep in 5 minutes.  Patient gets an average of 6-8 hours of sleep per night. He is not always refreshed.    Patient does not use electronics in bed.     Neymar does not do shift work.     Neymar does snore every night and snoring is very loud. Patient does have a regular bed partner. There is not report of kicking and punching.  He does have witnessed apneas. They occasionally sleep separately.  Patient sleeps on his side. He reports morning headaches and rare RLS symptoms. Neymar denies any bruxism, sleep walking, sleep  talking, dream enactment, sleep paralysis, cataplexy and hypnogogic/hypnopompic hallucinations.  Neymar denies difficulty breathing through his nose, claustrophobia and reflux at night.      Neymar has gained 0-5 pounds in the last year.  Patient describes themself as a morning person.  Patient's [place] Sleepiness score 5/24 inconsistent with excessive daytime sleepiness.  He has fatigue.    Neymar naps on rare occasions. He takes some inadvertant naps.  He denies falling asleep while driving.  Patient was counseled on the importance of driving while alert, to pull over if drowsy, or nap before getting into the vehicle if sleepy.  He uses 6 cups/day of coffee. Last caffeine intake is usually before 3 PM.    Allergies:    Allergies   Allergen Reactions     Codeine      Nausea and Vomiting       Medications:    Current Outpatient Medications   Medication Sig Dispense Refill     cetirizine (ZYRTEC) 10 MG tablet Take 10 mg by mouth daily  30 tablet 1     levothyroxine (SYNTHROID/LEVOTHROID) 150 MCG tablet TAKE 1 TABLET BY MOUTH DAILY 30 tablet 11     tamsulosin (FLOMAX) 0.4 MG capsule Take 1 capsule (0.4 mg) by mouth At Bedtime 30 capsule 1       Problem List:  Patient Active Problem List    Diagnosis Date Noted     Thyroid nodule 11/26/2019     Priority: Medium     Cervical stenosis of spine 09/30/2019     Priority: Medium     Pulmonary nodules 11/05/2016     Priority: Medium     Other specified hypothyroidism 10/21/2015     Priority: Medium     Hypothyroidism 04/03/2015     Priority: Medium     CHERISE (obstructive sleep apnea) 09/17/2014     Priority: Medium     BPH (benign prostatic hypertrophy) 06/29/2011     Priority: Medium        Past Medical/Surgical History:  Past Medical History:   Diagnosis Date     BPH (benign prostatic hyperplasia)      Sleep apnea     no CPAP     Thyroid disease      Past Surgical History:   Procedure Laterality Date     ARTHROSCOPY KNEE RT/LT      Numerous surgeries on Rt. Knee, R   Patella Fracture 1981     Microscopic Hematuria      Negative Cystoscopy and CT scan in 2009     Pulmonary Nodules      No change on repeat CT scan in 2009, no repeat imaging at this point     SEPTOPLASTY, TURBINOPLASTY, COMBINED N/A 12/17/2014    Procedure: COMBINED SEPTOPLASTY, TURBINOPLASTY;  Surgeon: Mingo Pelayo MD;  Location: [facility] OR     SURGICAL HISTORY OF -       Lt. Elbow tendon surgery     TONSILLECTOMY       VASECTOMY         Social History:  Social History     Socioeconomic History     Marital status:      Spouse name: Not on file     Number of children: Not on file     Years of education: Not on file     Highest education level: Not on file   Occupational History     Not on file   Social Needs     Financial resource strain: Not on file     Food insecurity     Worry: Not on file     Inability: Not on file     Transportation needs     Medical: Not on file     Non-medical: Not on file   Tobacco Use     Smoking status: Never Smoker     Smokeless tobacco: Never Used     Tobacco comment: <1 year of smoking, <1ppd over 30 years ago   Substance and Sexual Activity     Alcohol use: Yes     Alcohol/week: 0.0 standard drinks     Comment: 1-2 drinks per week-wine, has cut down     Drug use: No     Sexual activity: Yes     Partners: Female     Birth control/protection: Surgical     Comment: vasectomy   Lifestyle     Physical activity     Days per week: Not on file     Minutes per session: Not on file     Stress: Not on file   Relationships     Social connections     Talks on phone: Not on file     Gets together: Not on file     Attends Latter-day service: Not on file     Active member of club or organization: Not on file     Attends meetings of clubs or organizations: Not on file     Relationship status: Not on file     Intimate partner violence     Fear of current or ex partner: Not on file     Emotionally abused: Not on file     Physically abused: Not on file     Forced sexual activity: Not on file   Other  Topics Concern      Service No     Blood Transfusions No     Caffeine Concern Yes     Comment: 2 cups per day     Occupational Exposure No     Comment: in sales     Hobby Hazards Yes     Comment: scuba diving every few years     Sleep Concern Yes     Comment: night sweats for last one month-soaks sheets,  no cp, no palpitaion     Stress Concern Yes     Comment: some stress work busy and manageable     Weight Concern No     Special Diet Yes     Back Care No     Exercise Yes     Comment: 3 times per week at least runns and lifting weight and plays basket ball and coaches one as well     Bike Helmet No     Seat Belt Yes     Self-Exams Yes     Parent/sibling w/ CABG, MI or angioplasty before 65F 55M? 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 and youngest 6 yo,       Family History:  Family History   Problem Relation Age of Onset     Family History Negative Mother         alive 70     Thyroid Disease Mother         low thyroid     Family History Negative Father         alive 70     Family History Negative Sister         alive 40     Family History Negative Maternal Grandmother         alive-had ovarina cancer in her 50's age     Cerebrovascular Disease Maternal Grandmother      Diabetes Maternal Grandfather         late in life     Diabetes Paternal Grandmother      Cerebrovascular Disease Paternal Grandmother      Neurologic Disorder Paternal Grandmother         RLS     Family History Negative Paternal Grandfather      Cancer - colorectal No family hx of      Prostate Cancer No family hx of      C.A.D. No family hx of        Review of Systems:  A complete review of systems reviewed by me is negative with the exeption of what has been mentioned in the history of present illness.  CONSTITUTIONAL: NEGATIVE for weight gain/loss, fever, chills, sweats or night sweats, drug allergies.  EYES: NEGATIVE for changes in vision, blind spots, double vision.  ENT:  "NEGATIVE for ear pain, sore throat, sinus pain, post-nasal drip, runny nose, bloody nose  CARDIAC: NEGATIVE for fast heartbeats or fluttering in chest, chest pain or pressure, breathlessness when lying flat, swollen legs or swollen feet.  NEUROLOGIC: NEGATIVE headaches, weakness or numbness in the arms or legs.  DERMATOLOGIC: NEGATIVE for rashes, new moles or change in mole(s)  PULMONARY: NEGATIVE SOB at rest, SOB with activity, dry cough, productive cough, coughing up blood, wheezing or whistling when breathing.    GASTROINTESTINAL: NEGATIVE for nausea or vomitting, loose or watery stools, fat or grease in stools, constipation, abdominal pain, bowel movements black in color or blood noted.  GENITOURINARY: NEGATIVE for pain during urination, blood in urine, urinating more frequently than usual, irregular menstrual periods.  MUSCULOSKELETAL: NEGATIVE for muscle pain, bone or joint pain, swollen joints.  ENDOCRINE: NEGATIVE for increased thirst or urination, diabetes.  LYMPHATIC: NEGATIVE for swollen lymph nodes, lumps or bumps in the breasts or nipple discharge.    Physical Examination:  Vitals: Ht 1.778 m (5' 10\")   Wt 81.6 kg (180 lb)   BMI 25.83 kg/m    BMI= Body mass index is 25.83 kg/m .         [place] Total Score 6/28/2020   Total score - [place] 5            GENERAL APPEARANCE: healthy, alert and no distress  EYES: Eyes grossly normal to inspection  NECK: no asymmetry, masses, or scars  NEURO: Normal strength and tone, mentation intact and speech normal  PSYCH: mentation appears normal and affect normal/bright      Impression/Plan:  1. Moderate obstructive sleep apnea-  He wants to obtain a travel CPAP.  DME order placed.     Obstructive sleep apnea reviewed.  Complications of untreated sleep apnea were reviewed.    Follow up in 3 months.    Che Orellana PA-C    CC: No ref. provider found        "

## 2020-06-26 NOTE — TELEPHONE ENCOUNTER
Per RN patient will need his sleep provider fill out the form since we don't have the information.  Jessica Harkins

## 2020-06-26 NOTE — PATIENT INSTRUCTIONS
Your BMI is Body mass index is 25.83 kg/m .  Weight management is a personal decision.  If you are interested in exploring weight loss strategies, the following discussion covers the approaches that may be successful. Body mass index (BMI) is one way to tell whether you are at a healthy weight, overweight, or obese. It measures your weight in relation to your height.  A BMI of 18.5 to 24.9 is in the healthy range. A person with a BMI of 25 to 29.9 is considered overweight, and someone with a BMI of 30 or greater is considered obese. More than two-thirds of American adults are considered overweight or obese.  Being overweight or obese increases the risk for further weight gain. Excess weight may lead to heart disease and diabetes.  Creating and following plans for healthy eating and physical activity may help you improve your health.  Weight control is part of healthy lifestyle and includes exercise, emotional health, and healthy eating habits. Careful eating habits lifelong are the mainstay of weight control. Though there are significant health benefits from weight loss, long-term weight loss with diet alone may be very difficult to achieve- studies show long-term success with dietary management in less than 10% of people. Attaining a healthy weight may be especially difficult to achieve in those with severe obesity. In some cases, medications, devices and surgical management might be considered.  What can you do?  If you are overweight or obese and are interested in methods for weight loss, you should discuss this with your provider.     Consider reducing daily calorie intake by 500 calories.     Keep a food journal.     Avoiding skipping meals, consider cutting portions instead.    Diet combined with exercise helps maintain muscle while optimizing fat loss. Strength training is particularly important for building and maintaining muscle mass. Exercise helps reduce stress, increase energy, and improves fitness.  Increasing exercise without diet control, however, may not burn enough calories to loose weight.       Start walking three days a week 10-20 minutes at a time    Work towards walking thirty minutes five days a week     Eventually, increase the speed of your walking for 1-2 minutes at time    In addition, we recommend that you review healthy lifestyles and methods for weight loss available through the National Institutes of Health patient information sites:  http://win.niddk.nih.gov/publications/index.htm    And look into health and wellness programs that may be available through your health insurance provider, employer, local community center, or familia club.    Weight management plan: Patient was referred to their PCP to discuss a diet and exercise plan.

## 2020-06-29 ENCOUNTER — VIRTUAL VISIT (OUTPATIENT)
Dept: SLEEP MEDICINE | Facility: CLINIC | Age: 55
End: 2020-06-29
Payer: COMMERCIAL

## 2020-06-29 ENCOUNTER — MYC MEDICAL ADVICE (OUTPATIENT)
Dept: SLEEP MEDICINE | Facility: CLINIC | Age: 55
End: 2020-06-29

## 2020-06-29 ENCOUNTER — DOCUMENTATION ONLY (OUTPATIENT)
Dept: SLEEP MEDICINE | Facility: CLINIC | Age: 55
End: 2020-06-29

## 2020-06-29 DIAGNOSIS — G47.33 OSA (OBSTRUCTIVE SLEEP APNEA): Primary | ICD-10-CM

## 2020-06-29 PROCEDURE — 99203 OFFICE O/P NEW LOW 30 MIN: CPT | Mod: 95 | Performed by: PHYSICIAN ASSISTANT

## 2020-07-05 ENCOUNTER — MYC REFILL (OUTPATIENT)
Dept: FAMILY MEDICINE | Facility: CLINIC | Age: 55
End: 2020-07-05

## 2020-07-05 DIAGNOSIS — N40.1 BENIGN PROSTATIC HYPERPLASIA WITH LOWER URINARY TRACT SYMPTOMS, SYMPTOM DETAILS UNSPECIFIED: ICD-10-CM

## 2020-07-06 RX ORDER — TAMSULOSIN HYDROCHLORIDE 0.4 MG/1
0.4 CAPSULE ORAL AT BEDTIME
Qty: 30 CAPSULE | Refills: 10 | Status: SHIPPED | OUTPATIENT
Start: 2020-07-06 | End: 2020-10-06

## 2020-07-06 NOTE — TELEPHONE ENCOUNTER
Prescription approved per [facility] Refill Protocol.  Gabi Brothers RN, BSN

## 2020-07-22 NOTE — TELEPHONE ENCOUNTER
Cpap orders have been faxed to [email or web address] and pt has been contacted to schedule follow up end of September.    Pt states he will call back at later time to schedule.  ALHAJI Islas

## 2020-09-14 ENCOUNTER — TRANSFERRED RECORDS (OUTPATIENT)
Dept: HEALTH INFORMATION MANAGEMENT | Facility: CLINIC | Age: 55
End: 2020-09-14

## 2020-10-06 ENCOUNTER — OFFICE VISIT (OUTPATIENT)
Dept: FAMILY MEDICINE | Facility: CLINIC | Age: 55
End: 2020-10-06
Payer: COMMERCIAL

## 2020-10-06 VITALS
WEIGHT: 181 LBS | RESPIRATION RATE: 19 BRPM | SYSTOLIC BLOOD PRESSURE: 126 MMHG | HEART RATE: 57 BPM | DIASTOLIC BLOOD PRESSURE: 80 MMHG | TEMPERATURE: 99.5 F | HEIGHT: 70 IN | OXYGEN SATURATION: 97 % | BODY MASS INDEX: 25.91 KG/M2

## 2020-10-06 DIAGNOSIS — E03.8 OTHER SPECIFIED HYPOTHYROIDISM: ICD-10-CM

## 2020-10-06 DIAGNOSIS — Z00.00 ROUTINE GENERAL MEDICAL EXAMINATION AT A HEALTH CARE FACILITY: Primary | ICD-10-CM

## 2020-10-06 DIAGNOSIS — G47.33 OSA (OBSTRUCTIVE SLEEP APNEA): ICD-10-CM

## 2020-10-06 DIAGNOSIS — Z13.220 LIPID SCREENING: ICD-10-CM

## 2020-10-06 DIAGNOSIS — E03.9 HYPOTHYROIDISM, UNSPECIFIED TYPE: ICD-10-CM

## 2020-10-06 PROCEDURE — 36415 COLL VENOUS BLD VENIPUNCTURE: CPT | Performed by: FAMILY MEDICINE

## 2020-10-06 PROCEDURE — 84443 ASSAY THYROID STIM HORMONE: CPT | Performed by: FAMILY MEDICINE

## 2020-10-06 PROCEDURE — 99396 PREV VISIT EST AGE 40-64: CPT | Performed by: FAMILY MEDICINE

## 2020-10-06 PROCEDURE — 80061 LIPID PANEL: CPT | Performed by: FAMILY MEDICINE

## 2020-10-06 RX ORDER — LEVOTHYROXINE SODIUM 150 UG/1
TABLET ORAL
Qty: 90 TABLET | Refills: 3 | Status: SHIPPED | OUTPATIENT
Start: 2020-10-06 | End: 2021-10-07

## 2020-10-06 ASSESSMENT — ENCOUNTER SYMPTOMS
NAUSEA: 0
COUGH: 0
NERVOUS/ANXIOUS: 0
DYSURIA: 0
FEVER: 0
EYE PAIN: 0
FREQUENCY: 0
JOINT SWELLING: 0
DIARRHEA: 0
SHORTNESS OF BREATH: 0
CONSTIPATION: 0
HEMATOCHEZIA: 0
ARTHRALGIAS: 0
MYALGIAS: 0
SORE THROAT: 0
PALPITATIONS: 0
PARESTHESIAS: 0
HEMATURIA: 0
DIZZINESS: 0
WEAKNESS: 0
HEARTBURN: 0
CHILLS: 0
ABDOMINAL PAIN: 0
HEADACHES: 0

## 2020-10-06 ASSESSMENT — MIFFLIN-ST. JEOR: SCORE: 1652.26

## 2020-10-06 NOTE — PROGRESS NOTES
SUBJECTIVE:   CC: Neymar Traore is an 55 year old male who presents for preventative health visit.       Patient has been advised of split billing requirements and indicates understanding: Yes  Healthy Habits:     Getting at least 3 servings of Calcium per day:  Yes    Bi-annual eye exam:  Yes    Dental care twice a year:  NO    Sleep apnea or symptoms of sleep apnea:  Excessive snoring and Sleep apnea    Diet:  Regular (no restrictions)    Frequency of exercise:  4-5 days/week    Duration of exercise:  45-60 minutes    Taking medications regularly:  Yes    Medication side effects:  None    PHQ-2 Total Score: 0    Additional concerns today:  Yes    Patient with history of hypothyroidism over several years duration.  Has family history of Hashimoto's thyroiditis as well.  Likely Hashimoto's though did not have antibody testing in the past.    Patient had thyroid nodules noted on an ultrasound after incidental finding on prior CT.  Biopsies show benign.  Consider recheck in 1 year to make sure no changes.      Today's PHQ-2 Score:   PHQ-2 ( 1999 Pfizer) 10/6/2020   Q1: Little interest or pleasure in doing things 0   Q2: Feeling down, depressed or hopeless 0   PHQ-2 Score 0   Q1: Little interest or pleasure in doing things Not at all   Q2: Feeling down, depressed or hopeless Not at all   PHQ-2 Score 0       Abuse: Current or Past(Physical, Sexual or Emotional)- No  Do you feel safe in your environment? Yes        Social History     Tobacco Use     Smoking status: Never Smoker     Smokeless tobacco: Never Used     Tobacco comment: <1 year of smoking, <1ppd over 30 years ago   Substance Use Topics     Alcohol use: Yes     Alcohol/week: 0.0 standard drinks     Comment: 1-2 drinks per week-wine, has cut down     If you drink alcohol do you typically have >3 drinks per day or >7 drinks per week? No    Alcohol Use 10/6/2020   Prescreen: >3 drinks/day or >7 drinks/week? No   Prescreen: >3 drinks/day or >7 drinks/week? -    No flowsheet data found.    Last PSA:   PSA   Date Value Ref Range Status   10/27/2016 0.76 0 - 4 ug/L Final     Comment:     Assay Method:  Chemiluminescence using Siemens Vista analyzer       Reviewed orders with patient. Reviewed health maintenance and updated orders accordingly - Yes      Reviewed and updated as needed this visit by clinical staff  Tobacco  Allergies    Med Hx  Surg Hx  Fam Hx  Soc Hx        Reviewed and updated as needed this visit by Provider                Past Medical History:   Diagnosis Date     BPH (benign prostatic hyperplasia)      Sleep apnea     no CPAP     Thyroid disease       Past Surgical History:   Procedure Laterality Date     ARTHROSCOPY KNEE RT/LT      Numerous surgeries on Rt. Knee, R  Patella Fracture 1981     Microscopic Hematuria      Negative Cystoscopy and CT scan in 2009     Pulmonary Nodules      No change on repeat CT scan in 2009, no repeat imaging at this point     SEPTOPLASTY, TURBINOPLASTY, COMBINED N/A 12/17/2014    Procedure: COMBINED SEPTOPLASTY, TURBINOPLASTY;  Surgeon: Mingo Pelayo MD;  Location: [facility] OR     SURGICAL HISTORY OF -       Lt. Elbow tendon surgery     TONSILLECTOMY       VASECTOMY         Review of Systems   Constitutional: Negative for chills and fever.   HENT: Negative for congestion, ear pain, hearing loss and sore throat.    Eyes: Negative for pain and visual disturbance.   Respiratory: Negative for cough and shortness of breath.    Cardiovascular: Negative for chest pain, palpitations and peripheral edema.   Gastrointestinal: Negative for abdominal pain, constipation, diarrhea, heartburn, hematochezia and nausea.   Genitourinary: Negative for dysuria, frequency, genital sores, hematuria and urgency.   Musculoskeletal: Negative for arthralgias, joint swelling and myalgias.   Skin: Negative for rash.   Neurological: Negative for dizziness, weakness, headaches and paresthesias.   Psychiatric/Behavioral: Negative for mood changes. The  "patient is not nervous/anxious.      OBJECTIVE:   /80 (BP Location: Right arm, Patient Position: Chair, Cuff Size: Adult Regular)   Pulse 57   Temp 99.5  F (37.5  C) (Oral)   Resp 19   Ht 1.778 m (5' 10\")   Wt 82.1 kg (181 lb)   SpO2 97%   BMI 25.97 kg/m      Physical Exam  GENERAL: healthy, alert and no distress  EYES: Eyes grossly normal to inspection, PERRL and conjunctivae and sclerae normal  HENT: ear canals and TM's normal, nose and mouth without ulcers or lesions  NECK: no adenopathy, no asymmetry, masses, or scars and thyroid normal to palpation  RESP: lungs clear to auscultation - no rales, rhonchi or wheezes  CV: regular rate and rhythm, normal S1 S2, no S3 or S4, no murmur, click or rub, no peripheral edema and peripheral pulses strong  ABDOMEN: soft, nontender, without hepatosplenomegaly or masses   (male): normal male genitalia without lesions or urethral discharge, no hernia  MS: no gross musculoskeletal defects noted, no edema  SKIN: no suspicious lesions or rashes  NEURO: Normal strength and tone, mentation intact and speech normal  PSYCH: mentation appears normal, affect normal/bright    Diagnostic Test Results:  Labs reviewed in Epic    ASSESSMENT/PLAN:   1. Routine general medical examination at a health care facility    2. CHERISE (obstructive sleep apnea)    3. Other specified hypothyroidism    4. Hypothyroidism, unspecified type  - levothyroxine (SYNTHROID/LEVOTHROID) 150 MCG tablet; TAKE 1 TABLET BY MOUTH DAILY  Dispense: 90 tablet; Refill: 3  - TSH with free T4 reflex  - US Thyroid; Future    5. Lipid screening  - Lipid panel reflex to direct LDL Fasting    Patient has been advised of split billing requirements and indicates understanding: No  COUNSELING:   Reviewed preventive health counseling, as reflected in patient instructions    Estimated body mass index is 25.97 kg/m  as calculated from the following:    Height as of this encounter: 1.778 m (5' 10\").    Weight as of this " encounter: 82.1 kg (181 lb).     Weight management plan: Discussed healthy diet and exercise guidelines    He reports that he has never smoked. He has never used smokeless tobacco.      Counseling Resources:  ATP IV Guidelines  Pooled Cohorts Equation Calculator  FRAX Risk Assessment  ICSI Preventive Guidelines  Dietary Guidelines for Americans, 2010  USDA's MyPlate  ASA Prophylaxis  Lung CA Screening    James Radford MD  [facility]

## 2020-10-06 NOTE — PATIENT INSTRUCTIONS
Preventive Health Recommendations  Male Ages 50 - 64    Yearly exam:             See your health care provider every year in order to  o   Review health changes.   o   Discuss preventive care.    o   Review your medicines if your doctor has prescribed any.     Have a cholesterol test every 5 years, or more frequently if you are at risk for high cholesterol/heart disease.     Have a diabetes test (fasting glucose) every three years. If you are at risk for diabetes, you should have this test more often.     Have a colonoscopy at age 50, or have a yearly FIT test (stool test). These exams will check for colon cancer.      Talk with your health care provider about whether or not a prostate cancer screening test (PSA) is right for you.    You should be tested each year for STDs (sexually transmitted diseases), if you re at risk.     Shots: Get a flu shot each year. Get a tetanus shot every 10 years.     Nutrition:    Eat at least 5 servings of fruits and vegetables daily.     Eat whole-grain bread, whole-wheat pasta and brown rice instead of white grains and rice.     Get adequate Calcium and Vitamin D.     Lifestyle    Exercise for at least 150 minutes a week (30 minutes a day, 5 days a week). This will help you control your weight and prevent disease.     Limit alcohol to one drink per day.     No smoking.     Wear sunscreen to prevent skin cancer.     See your dentist every six months for an exam and cleaning.     See your eye doctor every 1 to 2 years.  Shingles vaccine - Shingrix - call your insurance company to see if this is covered and if this has better coverage at the pharmacy or the clinic.

## 2020-10-07 LAB
CHOLEST SERPL-MCNC: 202 MG/DL
HDLC SERPL-MCNC: 63 MG/DL
LDLC SERPL CALC-MCNC: 129 MG/DL
NONHDLC SERPL-MCNC: 139 MG/DL
TRIGL SERPL-MCNC: 51 MG/DL
TSH SERPL DL<=0.005 MIU/L-ACNC: 1.97 MU/L (ref 0.4–4)

## 2020-10-20 ENCOUNTER — HOSPITAL ENCOUNTER (OUTPATIENT)
Dept: ULTRASOUND IMAGING | Facility: CLINIC | Age: 55
Discharge: HOME | End: 2020-10-20
Attending: FAMILY MEDICINE | Admitting: FAMILY MEDICINE
Payer: COMMERCIAL

## 2020-10-20 DIAGNOSIS — E03.9 HYPOTHYROIDISM, UNSPECIFIED TYPE: ICD-10-CM

## 2020-10-20 PROCEDURE — 76536 US EXAM OF HEAD AND NECK: CPT

## 2020-12-21 ENCOUNTER — TRANSFERRED RECORDS (OUTPATIENT)
Dept: HEALTH INFORMATION MANAGEMENT | Facility: CLINIC | Age: 55
End: 2020-12-21

## 2021-04-05 ENCOUNTER — TRANSFERRED RECORDS (OUTPATIENT)
Dept: HEALTH INFORMATION MANAGEMENT | Facility: CLINIC | Age: 56
End: 2021-04-05

## 2021-05-20 ENCOUNTER — IMMUNIZATION (OUTPATIENT)
Dept: NURSING | Facility: CLINIC | Age: 56
End: 2021-05-20
Payer: COMMERCIAL

## 2021-05-20 PROCEDURE — 0001A PR COVID VAC PFIZER DIL RECON 30 MCG/0.3 ML IM: CPT

## 2021-05-20 PROCEDURE — 91300 PR COVID VAC PFIZER DIL RECON 30 MCG/0.3 ML IM: CPT

## 2021-06-10 ENCOUNTER — IMMUNIZATION (OUTPATIENT)
Dept: NURSING | Facility: CLINIC | Age: 56
End: 2021-06-10
Attending: INTERNAL MEDICINE
Payer: COMMERCIAL

## 2021-06-10 PROCEDURE — 0002A PR COVID VAC PFIZER DIL RECON 30 MCG/0.3 ML IM: CPT

## 2021-06-10 PROCEDURE — 91300 PR COVID VAC PFIZER DIL RECON 30 MCG/0.3 ML IM: CPT

## 2021-07-22 ENCOUNTER — VIRTUAL VISIT (OUTPATIENT)
Dept: FAMILY MEDICINE | Facility: CLINIC | Age: 56
End: 2021-07-22
Payer: COMMERCIAL

## 2021-07-22 VITALS — BODY MASS INDEX: 25.34 KG/M2 | HEIGHT: 70 IN | WEIGHT: 177 LBS | TEMPERATURE: 98.2 F

## 2021-07-22 DIAGNOSIS — N34.2 URETHRITIS: ICD-10-CM

## 2021-07-22 DIAGNOSIS — E03.9 HYPOTHYROIDISM, UNSPECIFIED TYPE: ICD-10-CM

## 2021-07-22 DIAGNOSIS — R41.840 INATTENTION: Primary | ICD-10-CM

## 2021-07-22 PROCEDURE — 99214 OFFICE O/P EST MOD 30 MIN: CPT | Mod: 95 | Performed by: FAMILY MEDICINE

## 2021-07-22 RX ORDER — FLUCONAZOLE 150 MG/1
150 TABLET ORAL ONCE
Qty: 1 TABLET | Refills: 0 | Status: SHIPPED | OUTPATIENT
Start: 2021-07-22 | End: 2021-07-22

## 2021-07-22 ASSESSMENT — MIFFLIN-ST. JEOR: SCORE: 1621.18

## 2021-07-22 NOTE — PROGRESS NOTES
Cali is a 56 year old who is being evaluated via a billable video visit.      How would you like to obtain your AVS? Jazz  If the video visit is dropped, the invitation should be resent by: Text to cell phone: [phone]  Will anyone else be joining your video visit? No       Video Start Time: 10:25 AM    Assessment & Plan     Inattention - will pursue ADHD evaluation as next step, sounds like symptoms have gotten worse since onset of more virtual work.   - MENTAL HEALTH REFERRAL  - Adult; Assessments and Testing; ADHD; Other: [facility] [phone]; We will contact you to schedule the appointment or please call with any questions; Future    Urethritis - will treat as in the past.   - fluconazole (DIFLUCAN) 150 MG tablet; Take 1 tablet (150 mg) by mouth once for 1 dose    Hypothyroidism, unspecified type - no significant changes in dosing schedule, weight. Symptoms as above unlikely to be related. Due for lab work in 10/2021      Return in about 3 months (around 10/22/2021) for Yearly Preventive Exam with his PCP.    Nancy Shelton MD  [facility]   Cali is a 56 year old who presents for the following health issues       ADHD Evaluation (Adult) Initial    Major concerns: ADHD evaluation      Current Employer/Job Duties:  sales  Work Concerns:  YES trouble staying with conversations and staying focused  Home Concerns:  No   Relationship Concerns:  No   Sleep: trouble staying asleep  Adult ADHD Self-Reporting Symptom Checklist given to kd:  No  At what age did the symptoms start? All his life worse in the last couple of years   Was there a diagnosis of ADD made previously? No    ++++++++++++++++++++++++++++++++++++++++++++++    Having more trouble over the past 6 months staying focused during conference calls. Mind tends to wander to emails, etc.     Also has trouble focusing on tasks around the home. Wife commented on trouble finishing tasks.     Would  feel more comfortable managing symptoms with CBT at this point, but would be open to medication discussion as well.       History of hypothyroidism, was stable last year October. Taking his med daily, no changes in weight.       Reports mild discomfort with urination. Has had this in the past, diagnosed with fungal urethritis and treated with fluconazole. This relieved his symptoms completely. Reports recent golf outing, was wearing sweaty clothing for a bit following.         A.D.H.D    History of Present Illness       Hypothyroidism:     Since last visit, patient describes the following symptoms::  None    He eats 2-3 servings of fruits and vegetables daily.He consumes 0 sweetened beverage(s) daily.He exercises with enough effort to increase his heart rate 30 to 60 minutes per day.  He exercises with enough effort to increase his heart rate 4 days per week.   He is taking medications regularly.         Review of Systems   Constitutional, HEENT, cardiovascular, pulmonary, gi and gu systems are negative, except as otherwise noted.      Objective           Vitals:  No vitals were obtained today due to virtual visit.    Physical Exam   GENERAL: Healthy, alert and no distress  EYES: Eyes grossly normal to inspection.  No discharge or erythema, or obvious scleral/conjunctival abnormalities.  RESP: No audible wheeze, cough, or visible cyanosis.  No visible retractions or increased work of breathing.    SKIN: Visible skin clear. No significant rash, abnormal pigmentation or lesions.  NEURO: Cranial nerves grossly intact.  Mentation and speech appropriate for age.  PSYCH: Mentation appears normal, affect normal/bright, judgement and insight intact, normal speech and appearance well-groomed.          Video-Visit Details    Type of service:  Video Visit    Video End Time:10:36 AM    Originating Location (pt. Location): Home    Distant Location (provider location):  [facility]     Platform used for  Video Visit: Werner

## 2021-08-21 ENCOUNTER — HOSPITAL ENCOUNTER (EMERGENCY)
Facility: CLINIC | Age: 56
Discharge: HOME | End: 2021-08-21
Attending: EMERGENCY MEDICINE | Admitting: EMERGENCY MEDICINE
Payer: COMMERCIAL

## 2021-08-21 ENCOUNTER — APPOINTMENT (OUTPATIENT)
Dept: GENERAL RADIOLOGY | Facility: CLINIC | Age: 56
End: 2021-08-21
Attending: EMERGENCY MEDICINE
Payer: COMMERCIAL

## 2021-08-21 VITALS
SYSTOLIC BLOOD PRESSURE: 145 MMHG | TEMPERATURE: 98.1 F | OXYGEN SATURATION: 99 % | DIASTOLIC BLOOD PRESSURE: 96 MMHG | RESPIRATION RATE: 18 BRPM | HEART RATE: 73 BPM

## 2021-08-21 DIAGNOSIS — R07.9 CHEST PAIN, UNSPECIFIED TYPE: ICD-10-CM

## 2021-08-21 LAB
ANION GAP SERPL CALCULATED.3IONS-SCNC: 4 MMOL/L (ref 3–14)
BASOPHILS # BLD AUTO: 0 10E3/UL (ref 0–0.2)
BASOPHILS NFR BLD AUTO: 1 %
BUN SERPL-MCNC: 17 MG/DL (ref 7–30)
CALCIUM SERPL-MCNC: 8.9 MG/DL (ref 8.5–10.1)
CHLORIDE BLD-SCNC: 109 MMOL/L (ref 94–109)
CO2 SERPL-SCNC: 28 MMOL/L (ref 20–32)
CREAT SERPL-MCNC: 1.08 MG/DL (ref 0.66–1.25)
D DIMER PPP FEU-MCNC: <0.27 UG/ML FEU (ref 0–0.5)
EOSINOPHIL # BLD AUTO: 0.1 10E3/UL (ref 0–0.7)
EOSINOPHIL NFR BLD AUTO: 1 %
ERYTHROCYTE [DISTWIDTH] IN BLOOD BY AUTOMATED COUNT: 12.7 % (ref 10–15)
GFR SERPL CREATININE-BSD FRML MDRD: 75 ML/MIN/1.73M2
GLUCOSE BLD-MCNC: 97 MG/DL (ref 70–99)
HCT VFR BLD AUTO: 39.9 % (ref 40–53)
HGB BLD-MCNC: 13.4 G/DL (ref 13.3–17.7)
IMM GRANULOCYTES # BLD: 0 10E3/UL
IMM GRANULOCYTES NFR BLD: 0 %
LYMPHOCYTES # BLD AUTO: 1.2 10E3/UL (ref 0.8–5.3)
LYMPHOCYTES NFR BLD AUTO: 25 %
MCH RBC QN AUTO: 31.8 PG (ref 26.5–33)
MCHC RBC AUTO-ENTMCNC: 33.6 G/DL (ref 31.5–36.5)
MCV RBC AUTO: 95 FL (ref 78–100)
MONOCYTES # BLD AUTO: 0.5 10E3/UL (ref 0–1.3)
MONOCYTES NFR BLD AUTO: 10 %
NEUTROPHILS # BLD AUTO: 3.1 10E3/UL (ref 1.6–8.3)
NEUTROPHILS NFR BLD AUTO: 63 %
NRBC # BLD AUTO: 0 10E3/UL
NRBC BLD AUTO-RTO: 0 /100
PLATELET # BLD AUTO: 215 10E3/UL (ref 150–450)
POTASSIUM BLD-SCNC: 3.9 MMOL/L (ref 3.4–5.3)
RBC # BLD AUTO: 4.21 10E6/UL (ref 4.4–5.9)
SARS-COV-2 RNA RESP QL NAA+PROBE: NEGATIVE
SODIUM SERPL-SCNC: 141 MMOL/L (ref 133–144)
T4 FREE SERPL-MCNC: 1.61 NG/DL (ref 0.76–1.46)
TROPONIN I SERPL-MCNC: <0.015 UG/L (ref 0–0.04)
TROPONIN I SERPL-MCNC: <0.015 UG/L (ref 0–0.04)
TSH SERPL DL<=0.005 MIU/L-ACNC: 0.18 MU/L (ref 0.4–4)
WBC # BLD AUTO: 4.9 10E3/UL (ref 4–11)

## 2021-08-21 PROCEDURE — 250N000013 HC RX MED GY IP 250 OP 250 PS 637: Performed by: EMERGENCY MEDICINE

## 2021-08-21 PROCEDURE — 99285 EMERGENCY DEPT VISIT HI MDM: CPT | Mod: 25

## 2021-08-21 PROCEDURE — C9803 HOPD COVID-19 SPEC COLLECT: HCPCS

## 2021-08-21 PROCEDURE — 84443 ASSAY THYROID STIM HORMONE: CPT | Performed by: EMERGENCY MEDICINE

## 2021-08-21 PROCEDURE — 85379 FIBRIN DEGRADATION QUANT: CPT | Performed by: EMERGENCY MEDICINE

## 2021-08-21 PROCEDURE — 36415 COLL VENOUS BLD VENIPUNCTURE: CPT | Performed by: EMERGENCY MEDICINE

## 2021-08-21 PROCEDURE — 71046 X-RAY EXAM CHEST 2 VIEWS: CPT

## 2021-08-21 PROCEDURE — 93005 ELECTROCARDIOGRAM TRACING: CPT

## 2021-08-21 PROCEDURE — 84439 ASSAY OF FREE THYROXINE: CPT | Performed by: EMERGENCY MEDICINE

## 2021-08-21 PROCEDURE — 85025 COMPLETE CBC W/AUTO DIFF WBC: CPT | Performed by: EMERGENCY MEDICINE

## 2021-08-21 PROCEDURE — 87635 SARS-COV-2 COVID-19 AMP PRB: CPT | Performed by: EMERGENCY MEDICINE

## 2021-08-21 PROCEDURE — 84484 ASSAY OF TROPONIN QUANT: CPT | Performed by: EMERGENCY MEDICINE

## 2021-08-21 PROCEDURE — 80048 BASIC METABOLIC PNL TOTAL CA: CPT | Performed by: EMERGENCY MEDICINE

## 2021-08-21 RX ORDER — ASPIRIN 325 MG
325 TABLET ORAL ONCE
Status: COMPLETED | OUTPATIENT
Start: 2021-08-21 | End: 2021-08-21

## 2021-08-21 RX ORDER — TAMSULOSIN HYDROCHLORIDE 0.4 MG/1
CAPSULE ORAL
COMMUNITY
Start: 2020-10-08 | End: 2021-10-07

## 2021-08-21 RX ADMIN — ASPIRIN 325 MG ORAL TABLET 325 MG: 325 PILL ORAL at 10:27

## 2021-08-21 ASSESSMENT — ENCOUNTER SYMPTOMS
NAUSEA: 0
VOMITING: 0
DIARRHEA: 0
COUGH: 1
FATIGUE: 1

## 2021-08-21 NOTE — ED PROVIDER NOTES
"  History   Chief Complaint:  Chest Pain    HPI   Neymar Traore is a 56 year old male with history of thyroid disease who presents with chest pain. The patient reports golfing today when he had a sudden onset of chest pain that he describes as something \"grabbing his chest\" that lasted a few seconds. He had 2 subsequent smaller episodes of similar pain that also lasted a few seconds. He states his pain would likely not have been exacerbated with pressure during each episode. Here in the ED, he is asymptomatic. He notes a history of slightly elevated cholesterol. He denies personal/family cardiac history and history of diabetes. He does note he has feel lethargic and unwell over the past few days with a mild cough. He otherwise denies nausea, emesis, diarrhea, and leg swelling. He was vaccinated for COVID in 5/2021.    Review of Systems   Constitutional: Positive for fatigue.   Respiratory: Positive for cough.    Cardiovascular: Positive for chest pain. Negative for leg swelling.   Gastrointestinal: Negative for diarrhea, nausea and vomiting.   All other systems reviewed and are negative.    Allergies:  Codeine    Medications:  Zytrec  Synthroid    Past Medical History:    Arthritis  BPH  Obstructive sleep apnea  Thyroid disease    Past Surgical History:    Right knee arthroscopy  Septoplasty, Turbinoplasty  Left elbow tendon surgery  Tonsillectomy  Vasectomy    Social History:  The patient presents with wife, Yesi.    Physical Exam     Patient Vitals for the past 24 hrs:   BP Temp Pulse Resp SpO2   08/21/21 1400 (!) 145/96 -- 73 -- 99 %   08/21/21 1300 (!) 134/91 -- 70 -- 99 %   08/21/21 1200 (!) 142/100 -- 73 -- 100 %   08/21/21 1130 (!) 150/102 -- 66 -- 99 %   08/21/21 1100 (!) 154/106 -- 61 -- 99 %   08/21/21 1030 (!) 160/109 -- 77 -- 98 %   08/21/21 1003 (!) 147/119 98.1  F (36.7  C) 95 18 99 %       Physical Exam  General: Alert, appears well-developed and well-nourished. Cooperative.     In no acute " distress  HEENT:  Head:  Atraumatic  Ears:  External ears are normal  Mouth/Throat:  Oropharynx is without erythema or exudate and mucous membranes are moist.   Eyes:   Conjunctivae normal and EOM are normal. No scleral icterus.  CV:  Normal rate, regular rhythm, normal heart sounds and radial pulses are 2+ and symmetric.  No murmur.  Resp:  Breath sounds are clear bilaterally    Non-labored, no retractions or accessory muscle use  GI:  Abdomen is soft, no distension, no tenderness. No rebound or guarding.    MS:  Normal range of motion. No edema.    Normal strength in all 4 extremities.     Back atraumatic.    No midline cervical, thoracic, or lumbar tenderness  Skin:  Warm and dry.  No rash or lesions noted.  Neuro: Alert. Normal strength.  GCS: 15  Psych:  Normal mood and affect.    Emergency Department Course   ECG  ECG taken at 1000, ECG read at 1011  Normal sinus rhythm  Normal EKG   No significant change as compared to prior, dated 12/12/2014.  Rate 83 bpm. IN interval 166 ms. QRS duration 90 ms. QT/QTc 384/451 ms. P-R-T axes 72 23 38.       Imaging:    X-ray Chest, 2 views:  No radiographic evidence of acute chest abnormality.   Result per radiology.     Laboratory:    CBC: WBC: 4.9, HGB: 13.4, PLT: 215  BMP: WNL (Creatinine: 1.08)  Troponin (Collected 1017): <0.015  Troponin (Collected 1325): <0.015  D-dimer: <0.27  TSH with Free T4 Reflex: 0.18 (L)  T4 Free: 1.61 (H)  Asymptomatic COVID19 Virus PCR by nasopharyngeal swab Negative     Emergency Department Course:    Reviewed:    I reviewed the patient's nursing notes, vitals, past medical records, Care Everywhere.     Assessments:    1010: I performed an exam of the patient and obtained history, as documented above.    1107: I rechecked the patient and updated them on findings.     1417: I rechecked the patient and updated them on findings. He is amenable to discharge.     Interventions:  1027: Aspirin 325 mg PO    Disposition:  The patient was discharged  to home.       HEART Score  Background  Calculates the overall risk of adverse event in patient's presenting with chest pain.  Based on 5 criteria (each assigned 0-2 points) including suspiciousness of history, EKG, age, risk factors and troponin.    Data  56 year old male  has BPH (benign prostatic hypertrophy); CHERISE (obstructive sleep apnea); Hypothyroidism; Pulmonary nodules; Cervical stenosis of spine; and Thyroid nodule on their problem list.   reports that he has never smoked. He has never used smokeless tobacco.  family history includes Cerebrovascular Disease in his maternal grandmother and paternal grandmother; Diabetes in his maternal grandfather and paternal grandmother; Family History Negative in his father, maternal grandmother, mother, paternal grandfather, and sister; Neurologic Disorder in his paternal grandmother; Thyroid Disease in his mother.  No results found for: TROPI  Criteria   0-2 points for each of 5 items (maximum of 10 points):  Score 0- History slightly suspicious for coronary syndrome  Score 0- EKG Normal  Score 1- Age 45 to 65 years old  Score 0- No risk factors for atherosclerotic disease  Score 0- Within normal limits for troponin levels  Interpretation  Risk of adverse outcome  Heart Score: 1  Total Score 0-3- Adverse Outcome Risk 2.5% - Supports early discharge with appropriate follow-up            Impression & Plan     Medical Decision Making:  Neymar Traore is a 56 year old male who presents for evaluation of chest pain.  Last brief episode of the chest discomfort was greater than 6 hours now.  Initial laboratory and imaging tests have come back normal.  There has been no progression of symptoms or other new concerning symptoms.        I considered a broad differential diagnosis in this patient including life-threatening etiologies such as acute coronary syndrome, myocardial infarction, pulmonary embolism, acute aortic dissection, myocarditis, pericarditis, acute valvular  insufficiency amongst others.  Other causes considered for this patient included pneumonia, pneumothorax, chest wall source, pericarditis, pleurisy, esophageal spasm, etc.  No serious etiology for the chest pain were detected today during this visit. Exceedingly low risk for unstable angina at this point and will therefore not admit formally and administer heparin.    Close follow up with primary care is indicated should the pain continue, as further work up may be performed; this was made clear to the patient, who understands.     Diagnosis:    ICD-10-CM    1. Chest pain, unspecified type  R07.9      Scribe Disclosure:  Moises VOGEL, am serving as a scribe at 10:12 AM on 8/21/2021 to document services personally performed by Sly Hu MD based on my observations and the provider's statements to me.        Sly Hu MD  08/21/21 [id number]

## 2021-08-21 NOTE — ED TRIAGE NOTES
Patient presents to the ED reporting chest pain. States had 3 episodes while walking and playing golf this morning that made him drop to his knees. Denies history of cardiac problems. Currently rates 1/10.

## 2021-08-23 LAB
ATRIAL RATE - MUSE: 83 BPM
DIASTOLIC BLOOD PRESSURE - MUSE: NORMAL MMHG
INTERPRETATION ECG - MUSE: NORMAL
P AXIS - MUSE: 72 DEGREES
PR INTERVAL - MUSE: 166 MS
QRS DURATION - MUSE: 90 MS
QT - MUSE: 384 MS
QTC - MUSE: 451 MS
R AXIS - MUSE: 23 DEGREES
SYSTOLIC BLOOD PRESSURE - MUSE: NORMAL MMHG
T AXIS - MUSE: 38 DEGREES
VENTRICULAR RATE- MUSE: 83 BPM

## 2021-09-27 ENCOUNTER — OFFICE VISIT (OUTPATIENT)
Dept: FAMILY MEDICINE | Facility: CLINIC | Age: 56
End: 2021-09-27
Payer: COMMERCIAL

## 2021-09-27 VITALS
HEIGHT: 70 IN | OXYGEN SATURATION: 96 % | TEMPERATURE: 98.2 F | RESPIRATION RATE: 16 BRPM | HEART RATE: 54 BPM | BODY MASS INDEX: 26.34 KG/M2 | DIASTOLIC BLOOD PRESSURE: 84 MMHG | SYSTOLIC BLOOD PRESSURE: 120 MMHG | WEIGHT: 184 LBS

## 2021-09-27 DIAGNOSIS — E03.9 HYPOTHYROIDISM, UNSPECIFIED TYPE: Primary | ICD-10-CM

## 2021-09-27 PROCEDURE — 36415 COLL VENOUS BLD VENIPUNCTURE: CPT | Performed by: FAMILY MEDICINE

## 2021-09-27 PROCEDURE — 84443 ASSAY THYROID STIM HORMONE: CPT | Performed by: FAMILY MEDICINE

## 2021-09-27 PROCEDURE — 90715 TDAP VACCINE 7 YRS/> IM: CPT | Performed by: FAMILY MEDICINE

## 2021-09-27 PROCEDURE — 90682 RIV4 VACC RECOMBINANT DNA IM: CPT | Performed by: FAMILY MEDICINE

## 2021-09-27 PROCEDURE — 84439 ASSAY OF FREE THYROXINE: CPT | Performed by: FAMILY MEDICINE

## 2021-09-27 PROCEDURE — 90471 IMMUNIZATION ADMIN: CPT | Performed by: FAMILY MEDICINE

## 2021-09-27 PROCEDURE — 90472 IMMUNIZATION ADMIN EACH ADD: CPT | Performed by: FAMILY MEDICINE

## 2021-09-27 PROCEDURE — 99213 OFFICE O/P EST LOW 20 MIN: CPT | Mod: 25 | Performed by: FAMILY MEDICINE

## 2021-09-27 ASSESSMENT — MIFFLIN-ST. JEOR: SCORE: 1660.87

## 2021-09-27 NOTE — PROGRESS NOTES
"    Assessment & Plan     Hypothyroidism, unspecified type  - Patient was in the emergency room for chest discomfort 8/21/21. Work-up was not indicative of an acute cardiac event. The patient has not experienced any further episodes and reports feeling better today. The patient also reports he may have mixed the 175 mcg and 150 mcg levothyroxine pills together prior to the chest discomfort episode. Patient has had dosing titrations in the past, may need endocrine referral in future. Recommend re-checking TSH and free T4 today.   -Discussed importance of consistency and taking medication first thing in the morning without other medications or food.   - TSH with free T4 reflex             BMI:   Estimated body mass index is 26.4 kg/m  as calculated from the following:    Height as of this encounter: 1.778 m (5' 10\").    Weight as of this encounter: 83.5 kg (184 lb).     Return in about 3 months (around 12/27/2021) for lab-only visit fasting.    James Radford MD  [facility]    Shun Leiva is a 56 year old who male with a history of thyroid disease who presents to the clinic for a follow-up after going to the emergency room for chest discomfort. The patient reports he had an episode of chest tightness that lasted for about 15 seconds. He rated the pain a 7/10.     History of Present Illness       Hypothyroidism:     Since last visit, patient describes the following symptoms::  None    He eats 2-3 servings of fruits and vegetables daily.He consumes 0 sweetened beverage(s) daily.He exercises with enough effort to increase his heart rate 30 to 60 minutes per day.    He is taking medications regularly.     Musculoskeletal problem/pain  Onset/Duration: several months  Description  Location: Left hand all the way to shoulder. - left  Joint Swelling: no  Redness: no  Pain: YES  Warmth: no  Intensity:  moderate  Progression of Symptoms:  same and constant  Accompanying signs and symptoms: " "  Fevers: no  Numbness/tingling/weakness: YES, Numbness and tingling in toes  History  Trauma to the area: no  Recent illness:  no  Previous similar problem: YES  Previous evaluation:  YES  Precipitating or alleviating factors:  Aggravating factors include: exercise  Therapies tried and outcome: nothing    Review of Systems   ENT/MOUTH: NEGATIVE for ear, mouth and throat problems  RESP: POSITIVE for persistent cough  CV: NEGATIVE for chest pain, palpitations or peripheral edema  NEURO: NEGATIVE for weakness, dizziness or paresthesias      Objective    /84 (BP Location: Right arm, Patient Position: Sitting, Cuff Size: Adult Regular)   Pulse 54   Temp 98.2  F (36.8  C) (Oral)   Resp 16   Ht 1.778 m (5' 10\")   Wt 83.5 kg (184 lb)   SpO2 96%   BMI 26.40 kg/m    Body mass index is 26.4 kg/m .  Physical Exam   GENERAL: healthy, alert and no distress  CV: regular rate and rhythm, normal S1 S2, no S3 or S4, no murmur, click or rub, no peripheral edema and peripheral pulses strong      Physician Attestation   I, James Radford, was present with the medical/EMILY student who participated in the service and in the documentation of the note.  I have verified the history and personally performed the physical exam and medical decision making.  I agree with the assessment and plan of care as documented in the note.      Items personally reviewed: vitals and labs.    James Radford MD        "

## 2021-09-28 LAB
T4 FREE SERPL-MCNC: 1.02 NG/DL (ref 0.76–1.46)
TSH SERPL DL<=0.005 MIU/L-ACNC: 6.34 MU/L (ref 0.4–4)

## 2021-10-07 ENCOUNTER — OFFICE VISIT (OUTPATIENT)
Dept: FAMILY MEDICINE | Facility: CLINIC | Age: 56
End: 2021-10-07
Payer: COMMERCIAL

## 2021-10-07 VITALS
BODY MASS INDEX: 26.98 KG/M2 | WEIGHT: 182.2 LBS | RESPIRATION RATE: 16 BRPM | HEIGHT: 69 IN | OXYGEN SATURATION: 97 % | SYSTOLIC BLOOD PRESSURE: 136 MMHG | HEART RATE: 63 BPM | DIASTOLIC BLOOD PRESSURE: 84 MMHG | TEMPERATURE: 97.9 F

## 2021-10-07 DIAGNOSIS — Z00.00 ROUTINE GENERAL MEDICAL EXAMINATION AT A HEALTH CARE FACILITY: Primary | ICD-10-CM

## 2021-10-07 DIAGNOSIS — E04.1 THYROID NODULE: ICD-10-CM

## 2021-10-07 DIAGNOSIS — R39.14 BENIGN PROSTATIC HYPERPLASIA WITH INCOMPLETE BLADDER EMPTYING: ICD-10-CM

## 2021-10-07 DIAGNOSIS — E03.9 HYPOTHYROIDISM, UNSPECIFIED TYPE: ICD-10-CM

## 2021-10-07 DIAGNOSIS — N40.1 BENIGN PROSTATIC HYPERPLASIA WITH INCOMPLETE BLADDER EMPTYING: ICD-10-CM

## 2021-10-07 DIAGNOSIS — M48.02 CERVICAL STENOSIS OF SPINE: ICD-10-CM

## 2021-10-07 PROCEDURE — 99396 PREV VISIT EST AGE 40-64: CPT | Performed by: FAMILY MEDICINE

## 2021-10-07 RX ORDER — LEVOTHYROXINE SODIUM 150 UG/1
TABLET ORAL
Qty: 90 TABLET | Refills: 3 | Status: SHIPPED | OUTPATIENT
Start: 2021-10-07 | End: 2022-09-06

## 2021-10-07 RX ORDER — TAMSULOSIN HYDROCHLORIDE 0.4 MG/1
CAPSULE ORAL
Qty: 90 CAPSULE | Refills: 3 | Status: SHIPPED | OUTPATIENT
Start: 2021-10-07 | End: 2022-11-14

## 2021-10-07 ASSESSMENT — ENCOUNTER SYMPTOMS
HEADACHES: 0
HEMATURIA: 0
NERVOUS/ANXIOUS: 0
ABDOMINAL PAIN: 0
ARTHRALGIAS: 1
MYALGIAS: 1
NAUSEA: 0
WEAKNESS: 1
SHORTNESS OF BREATH: 0
DIARRHEA: 0
HEARTBURN: 0
DIZZINESS: 0
HEMATOCHEZIA: 0
CONSTIPATION: 0
FEVER: 0
CHILLS: 0
PARESTHESIAS: 0
EYE PAIN: 0
COUGH: 0
PALPITATIONS: 0
JOINT SWELLING: 0
DYSURIA: 0
FREQUENCY: 0
SORE THROAT: 0

## 2021-10-07 ASSESSMENT — MIFFLIN-ST. JEOR: SCORE: 1640.79

## 2021-10-07 NOTE — PROGRESS NOTES
SUBJECTIVE:   CC: Neymar Traore is an 56 year old male who presents for preventative health visit.     Patient has been advised of split billing requirements and indicates understanding: Yes  Healthy Habits:     Getting at least 3 servings of Calcium per day:  NO    Bi-annual eye exam:  Yes    Dental care twice a year:  NO    Sleep apnea or symptoms of sleep apnea:  Daytime drowsiness, Excessive snoring and Sleep apnea    Diet:  Regular (no restrictions)    Frequency of exercise:  2-3 days/week    Duration of exercise:  30-45 minutes    Taking medications regularly:  Yes    Medication side effects:  None    PHQ-2 Total Score: 0    Additional concerns today:  No    Patient with history of hypothyroidism over several years duration.  Has family history of Hashimoto's thyroiditis as well.  Likely Hashimoto's though did not have antibody testing in the past.    Patient had thyroid nodules noted on an ultrasound after incidental finding on prior CT.  Biopsies show benign finding after initial biopsy with atypia of undetermined significance.  Repeat US annually planned.    Today's PHQ-2 Score:   PHQ-2 ( 1999 [employer]) 10/7/2021   Q1: Little interest or pleasure in doing things 0   Q2: Feeling down, depressed or hopeless 0   PHQ-2 Score 0   Q1: Little interest or pleasure in doing things Not at all   Q2: Feeling down, depressed or hopeless Not at all   PHQ-2 Score 0     Abuse: Current or Past(Physical, Sexual or Emotional)- No  Do you feel safe in your environment? Yes    Social History     Tobacco Use     Smoking status: Never Smoker     Smokeless tobacco: Never Used     Tobacco comment: <1 year of smoking, <1ppd over 30 years ago   Substance Use Topics     Alcohol use: Yes     Alcohol/week: 0.0 standard drinks     Comment: 1-2 drinks per week-wine, has cut down     If you drink alcohol do you typically have >3 drinks per day or >7 drinks per week? No    Alcohol Use 10/7/2021   Prescreen: >3 drinks/day or >7  drinks/week? Yes   Prescreen: >3 drinks/day or >7 drinks/week? -   AUDIT SCORE  4       Last PSA:   PSA   Date Value Ref Range Status   10/27/2016 0.76 0 - 4 ug/L Final     Comment:     Assay Method:  Chemiluminescence using Siemens Vista analyzer       Reviewed orders with patient. Reviewed health maintenance and updated orders accordingly - Yes    Reviewed and updated as needed this visit by clinical staff  Tobacco  Allergies  Meds   Med Hx  Surg Hx  Fam Hx  Soc Hx        Reviewed and updated as needed this visit by Provider                Past Medical History:   Diagnosis Date     Arthritis 9/2015    Right knee     BPH (benign prostatic hyperplasia)      Sleep apnea     no CPAP     Thyroid disease       Past Surgical History:   Procedure Laterality Date     ARTHROSCOPY KNEE RT/LT      Numerous surgeries on Rt. Knee, R  Patella Fracture 1981     COLONOSCOPY  2010 and 2015 and 2020     Microscopic Hematuria      Negative Cystoscopy and CT scan in 2009     Pulmonary Nodules      No change on repeat CT scan in 2009, no repeat imaging at this point     SEPTOPLASTY, TURBINOPLASTY, COMBINED N/A 12/17/2014    Procedure: COMBINED SEPTOPLASTY, TURBINOPLASTY;  Surgeon: Mingo Pelayo MD;  Location: [facility] OR     SURGICAL HISTORY OF -       Lt. Elbow tendon surgery     TONSILLECTOMY       VASECTOMY       Review of Systems   Constitutional: Negative for chills and fever.   HENT: Negative for congestion, ear pain, hearing loss and sore throat.    Eyes: Negative for pain and visual disturbance.   Respiratory: Negative for cough and shortness of breath.    Cardiovascular: Negative for chest pain, palpitations and peripheral edema.   Gastrointestinal: Negative for abdominal pain, constipation, diarrhea, heartburn, hematochezia and nausea.   Genitourinary: Negative for discharge, dysuria, frequency, genital sores, hematuria, impotence and urgency.   Musculoskeletal: Positive for arthralgias and myalgias. Negative for joint  "swelling.   Skin: Negative for rash.   Neurological: Positive for weakness. Negative for dizziness, headaches and paresthesias.   Psychiatric/Behavioral: Negative for mood changes. The patient is not nervous/anxious.      OBJECTIVE:   /84 (BP Location: Right arm, Patient Position: Sitting, Cuff Size: Adult Regular)   Pulse 63   Temp 97.9  F (36.6  C) (Oral)   Resp 16   Ht 1.759 m (5' 9.25\")   Wt 82.6 kg (182 lb 3.2 oz)   SpO2 97%   BMI 26.71 kg/m      Physical Exam  GENERAL: healthy, alert and no distress  EYES: Eyes grossly normal to inspection, PERRL and conjunctivae and sclerae normal  HENT: ear canals and TM's normal, nose and mouth without ulcers or lesions  NECK: no adenopathy, no asymmetry, masses, or scars and thyroid normal to palpation  RESP: lungs clear to auscultation - no rales, rhonchi or wheezes  CV: regular rate and rhythm, normal S1 S2, no S3 or S4, no murmur, click or rub, no peripheral edema and peripheral pulses strong  ABDOMEN: soft, nontender, no hepatosplenomegaly, no masses and bowel sounds normal  MS: no gross musculoskeletal defects noted, no edema  SKIN: no suspicious lesions or rashes  NEURO: Normal strength and tone, mentation intact and speech normal  PSYCH: mentation appears normal, affect normal/bright    Diagnostic Test Results:  Labs reviewed in Epic    ASSESSMENT/PLAN:   1. Routine general medical examination at a health care facility    2. Hypothyroidism, unspecified type  - levothyroxine (SYNTHROID/LEVOTHROID) 150 MCG tablet; TAKE 1 TABLET BY MOUTH DAILY  Dispense: 90 tablet; Refill: 3    3. Benign prostatic hyperplasia with incomplete bladder emptying  - tamsulosin (FLOMAX) 0.4 MG capsule; TAKE 1 CAPSULE (0.4 MG) BY MOUTH AT BEDTIME  Dispense: 90 capsule; Refill: 3    4. Thyroid nodule  - US Thyroid; Future    5. Cervical stenosis of spine    Patient has been advised of split billing requirements and indicates understanding: No  COUNSELING:   Reviewed preventive " "health counseling, as reflected in patient instructions    Estimated body mass index is 26.71 kg/m  as calculated from the following:    Height as of this encounter: 1.759 m (5' 9.25\").    Weight as of this encounter: 82.6 kg (182 lb 3.2 oz).     Weight management plan: Discussed healthy diet and exercise guidelines    He reports that he has never smoked. He has never used smokeless tobacco.      Counseling Resources:  ATP IV Guidelines  Pooled Cohorts Equation Calculator  FRAX Risk Assessment  ICSI Preventive Guidelines  Dietary Guidelines for Americans, 2010  USDA's MyPlate  ASA Prophylaxis  Lung CA Screening    James Radford MD  [facility]  "

## 2021-10-12 ENCOUNTER — HOSPITAL ENCOUNTER (OUTPATIENT)
Dept: ULTRASOUND IMAGING | Facility: CLINIC | Age: 56
Discharge: HOME | End: 2021-10-12
Attending: FAMILY MEDICINE | Admitting: FAMILY MEDICINE
Payer: COMMERCIAL

## 2021-10-12 DIAGNOSIS — E04.1 THYROID NODULE: ICD-10-CM

## 2021-10-12 PROCEDURE — 76536 US EXAM OF HEAD AND NECK: CPT

## 2021-10-14 ENCOUNTER — TELEPHONE (OUTPATIENT)
Dept: FAMILY MEDICINE | Facility: CLINIC | Age: 56
End: 2021-10-14

## 2021-10-14 DIAGNOSIS — E04.1 THYROID NODULE: Primary | ICD-10-CM

## 2021-10-14 NOTE — TELEPHONE ENCOUNTER
Discussed results.  Unchanged size of thyroid nodules but microcalcification on nodule #1 not noted before for higher TI-RADS scoring.  Recommend nodule 1 biopsy.  If normal continue annual ultrasounds for 5 years.

## 2021-10-15 DIAGNOSIS — Z11.59 ENCOUNTER FOR SCREENING FOR OTHER VIRAL DISEASES: ICD-10-CM

## 2021-10-22 ENCOUNTER — LAB (OUTPATIENT)
Dept: LAB | Facility: CLINIC | Age: 56
End: 2021-10-22
Payer: COMMERCIAL

## 2021-10-22 DIAGNOSIS — Z11.59 ENCOUNTER FOR SCREENING FOR OTHER VIRAL DISEASES: ICD-10-CM

## 2021-10-22 PROCEDURE — U0003 INFECTIOUS AGENT DETECTION BY NUCLEIC ACID (DNA OR RNA); SEVERE ACUTE RESPIRATORY SYNDROME CORONAVIRUS 2 (SARS-COV-2) (CORONAVIRUS DISEASE [COVID-19]), AMPLIFIED PROBE TECHNIQUE, MAKING USE OF HIGH THROUGHPUT TECHNOLOGIES AS DESCRIBED BY CMS-2020-01-R: HCPCS

## 2021-10-22 PROCEDURE — U0005 INFEC AGEN DETEC AMPLI PROBE: HCPCS

## 2021-10-23 LAB — SARS-COV-2 RNA RESP QL NAA+PROBE: NEGATIVE

## 2021-10-25 ENCOUNTER — HOSPITAL ENCOUNTER (OUTPATIENT)
Dept: ULTRASOUND IMAGING | Facility: CLINIC | Age: 56
Discharge: HOME | End: 2021-10-25
Attending: FAMILY MEDICINE | Admitting: FAMILY MEDICINE
Payer: COMMERCIAL

## 2021-10-25 VITALS — SYSTOLIC BLOOD PRESSURE: 129 MMHG | HEART RATE: 61 BPM | OXYGEN SATURATION: 97 % | DIASTOLIC BLOOD PRESSURE: 89 MMHG

## 2021-10-25 DIAGNOSIS — E04.1 THYROID NODULE: ICD-10-CM

## 2021-10-25 PROCEDURE — 272N000431 US BIOPSY THYROID FINE NEEDLE ASPIRATION

## 2021-10-25 PROCEDURE — 250N000009 HC RX 250: Performed by: RADIOLOGY

## 2021-10-25 PROCEDURE — 88173 CYTOPATH EVAL FNA REPORT: CPT | Mod: TC | Performed by: FAMILY MEDICINE

## 2021-10-25 RX ORDER — LIDOCAINE HYDROCHLORIDE 10 MG/ML
10 INJECTION, SOLUTION EPIDURAL; INFILTRATION; INTRACAUDAL; PERINEURAL ONCE
Status: COMPLETED | OUTPATIENT
Start: 2021-10-25 | End: 2021-10-25

## 2021-10-25 RX ADMIN — LIDOCAINE HYDROCHLORIDE 10 ML: 10 INJECTION, SOLUTION EPIDURAL; INFILTRATION; INTRACAUDAL; PERINEURAL at 10:40

## 2021-10-25 NOTE — PROGRESS NOTES
Patient tolerated thyroid biopsy with Dr. Rangel well.  Specimens collected and sent to lab for processing -wet/dry slides, in alcohol, and in affirma.  Band aid dressing applied, dry and intact.  Patient stated understanding of discharge instructions and left ambulatory by himself. michael

## 2021-10-26 LAB
PATH REPORT.COMMENTS IMP SPEC: NORMAL
PATH REPORT.FINAL DX SPEC: NORMAL
PATH REPORT.GROSS SPEC: NORMAL
PATH REPORT.MICROSCOPIC SPEC OTHER STN: NORMAL
PATH REPORT.RELEVANT HX SPEC: NORMAL

## 2021-10-26 PROCEDURE — 88173 CYTOPATH EVAL FNA REPORT: CPT | Mod: 26 | Performed by: PATHOLOGY

## 2021-10-28 ENCOUNTER — NURSE TRIAGE (OUTPATIENT)
Dept: FAMILY MEDICINE | Facility: CLINIC | Age: 56
End: 2021-10-28

## 2021-10-29 ENCOUNTER — TRANSFERRED RECORDS (OUTPATIENT)
Dept: HEALTH INFORMATION MANAGEMENT | Facility: CLINIC | Age: 56
End: 2021-10-29

## 2021-11-01 ENCOUNTER — TELEPHONE (OUTPATIENT)
Dept: ENDOCRINOLOGY | Facility: CLINIC | Age: 56
End: 2021-11-01

## 2021-11-01 NOTE — TELEPHONE ENCOUNTER
Health Call Center    Phone Message    May a detailed message be left on voicemail: yes     Reason for Call: Other: Patient is being referred to be seen for Thyroid nodule; hypothyroidism. Ref by Dr Radford. Patient is currently scheduled on 12/2/21 at 9:30am with Dr Martinez. Appointment is outside of the 2 week time frame per guidelines. Sending encounter to clinic for review. Please contact patient to schedule if sooner opening is needed.     Action Taken: Message routed to:  [facility] ([facility] Endocrinology    Travel Screening: Not Applicable

## 2021-11-03 NOTE — TELEPHONE ENCOUNTER
Endocrine triage  The scheduled first available endocrine appointment timeframe is acceptable  Vi Guajardo MD

## 2021-11-25 ASSESSMENT — ENCOUNTER SYMPTOMS
NERVOUS/ANXIOUS: 1
INSOMNIA: 1
EYE REDNESS: 0
DEPRESSION: 1
DOUBLE VISION: 0
PANIC: 0
DECREASED CONCENTRATION: 1
EYE IRRITATION: 0
EYE WATERING: 0
EYE PAIN: 0

## 2021-11-27 NOTE — TELEPHONE ENCOUNTER
RECORDS RECEIVED FROM: Internal   DATE RECEIVED: 12.2.21   NOTES (FOR ALL VISITS) STATUS DETAILS   OFFICE NOTES from referring provider Internal 9.27.21, 11.1.2, 12.3.191 OSIEL Radford    OFFICE NOTES from other specialist N/A    ED NOTES N/A    OPERATIVE REPORT  (thyroid, pituitary, adrenal, parathyroid) N/A    MEDICATION LIST Internal    IMAGING      DEXASCAN N/A    MRI (BRAIN) N/A    XR (Chest) Internal 8.21.21   CT (HEAD/NECK/CHEST/ABDOMEN) N/A    NUCLEAR  N/A    ULTRASOUND (HEAD/NECK) Internal 10.25.21 thyroid  10.12.21 thyroid   LABS     DIABETES: HBGA1C, CREATININE, FASTING LIPIDS, MICROALBUMIN URINE, POTASSIUM, TSH, T4    THYROID: TSH, T4, CBC, THYRODLONULIN, TOTAL T3, FREE T4, CALCITONIN, CEA Internal

## 2021-12-02 ENCOUNTER — PRE VISIT (OUTPATIENT)
Dept: ENDOCRINOLOGY | Facility: CLINIC | Age: 56
End: 2021-12-02

## 2021-12-02 ENCOUNTER — VIRTUAL VISIT (OUTPATIENT)
Dept: ENDOCRINOLOGY | Facility: CLINIC | Age: 56
End: 2021-12-02
Payer: COMMERCIAL

## 2021-12-02 DIAGNOSIS — E04.1 THYROID NODULE: ICD-10-CM

## 2021-12-02 DIAGNOSIS — E03.9 HYPOTHYROIDISM, UNSPECIFIED TYPE: ICD-10-CM

## 2021-12-02 PROCEDURE — 99205 OFFICE O/P NEW HI 60 MIN: CPT | Mod: 95 | Performed by: STUDENT IN AN ORGANIZED HEALTH CARE EDUCATION/TRAINING PROGRAM

## 2021-12-02 NOTE — PROGRESS NOTES
Neymar Traore  is being evaluated via a billable video visit.      How would you like to obtain your AVS? 8Trip  For the video visit, send the invitation by: Text to cell phone: [phone]  Will anyone else be joining your video visit? No

## 2021-12-02 NOTE — LETTER
12/2/2021       RE: Neymar Traore  [address]  [place] [address]     Dear Colleague,    Thank you for referring your patient, Neymar Traore, to the [facility] ENDOCRINOLOG[facility] [place] [facility]. Please see a copy of my visit note below.    Neymar Traore  is being evaluated via a billable video visit.      How would you like to obtain your AVS? QVPNhart  For the video visit, send the invitation by: Text to cell phone: [phone]  Will anyone else be joining your video visit? No          Endocrinology Clinic Visit 12/2/2021    NAME:  Neymar Traore  PCP:  James Radford  MRN:  [id number]  Reason for Consult:  Abnormal thyroid labs  Requesting Provider:  James Radford    Video-Visit Details     Type of service:  Video Visit     Video Start Time: 9:38 am  Video End Time: 10:14 am     I spent a total of 70 minutes on the date of encounter reviewing medical records, evaluating the patient, coordinating care and documenting in the EHR, as detailed above.        Platform used for Video Visit: nScaled        Chief Complaint     Chief Complaint   Patient presents with     Video Visit       History of Present Illness     Neymar Traore is a 56 year old male who is seen in clinic for abnormal thyroid labs    Patient had the following labs checked:   ENDO THYROID LABS-[facility] Latest Ref Rng & Units 9/27/2021 8/21/2021   TSH 0.40 - 4.00 mU/L 6.34 (H) 0.18 (L)   FREE T4 0.76 - 1.46 ng/dL 1.02 1.61 (H)       He was diagnosed with subclinical hypothyroidism on 9/2014, TSH was 8.61 and free T4 was 0.88.  He reported no overt hypothyroidism symptoms at that time.  He was started on LT 4 replacement.  He has been on LT 4 replacement since with doses ranging 150 to 175 mcg daily.  Most recently he has been on 150 mcg daily for several months.  On August 2021, he had an episode of chest pain and palpitation while exercising, cardiac etiology was ruled  out, TFT showed mild hyperthyroidism.  He was not sure if he was mixing his doses between 175 and 150 LT 4 replacement.  He continued to take 150 150 MCG daily.  Repeat TFT with TSH of 6.34 and free T4 of 1.02.  Since then he started multivitamin and he was instructed to take his levothyroxine at an empty stomach with no other medication.      Symptoms of thyroid disease:  The patient reports: He gained 5-7 lbs since august.     The patient denies any denies fatigue, weight changes, heat/cold intolerance, bowel/skin changes or CVS symptoms.    No recent history of febrile illness with neck pain or sore throat.     He denied any GI symptoms, no concern for malabsorption or celiac. On ROS he reported joint pain and stiffness especially in the morning, last AN was negative in 2012.    Family history of thyroid disease: Yes: mother, aunt have hypothyroidism  Family history of thyroid cancer: Yes: thyroid cancer.  Personal history of high-dose radiation especially in childhood: No    Problem List     Patient Active Problem List   Diagnosis     BPH (benign prostatic hypertrophy)     CHERISE (obstructive sleep apnea)     Hypothyroidism     Pulmonary nodules     Cervical stenosis of spine     Thyroid nodule        Medications     Current Outpatient Medications   Medication     cetirizine (ZYRTEC) 10 MG tablet     levothyroxine (SYNTHROID/LEVOTHROID) 150 MCG tablet     tamsulosin (FLOMAX) 0.4 MG capsule     No current facility-administered medications for this visit.        Allergies     Allergies   Allergen Reactions     Codeine      Nausea and Vomiting       Medical / Surgical History     Past Medical History:   Diagnosis Date     Arthritis 9/2015    Right knee     BPH (benign prostatic hyperplasia)      Sleep apnea     no CPAP     Thyroid disease      Past Surgical History:   Procedure Laterality Date     ARTHROSCOPY KNEE RT/LT      Numerous surgeries on Rt. Knee, R  Patella Fracture 1981     COLONOSCOPY  2010 and 2015 and  2020     Microscopic Hematuria      Negative Cystoscopy and CT scan in 2009     Pulmonary Nodules      No change on repeat CT scan in 2009, no repeat imaging at this point     SEPTOPLASTY, TURBINOPLASTY, COMBINED N/A 12/17/2014    Procedure: COMBINED SEPTOPLASTY, TURBINOPLASTY;  Surgeon: Mingo Pelayo MD;  Location: [facility] OR     SURGICAL HISTORY OF -       Lt. Elbow tendon surgery     TONSILLECTOMY       VASECTOMY         Social History     Social History     Socioeconomic History     Marital status:      Spouse name: Not on file     Number of children: Not on file     Years of education: Not on file     Highest education level: Not on file   Occupational History     Not on file   Tobacco Use     Smoking status: Never Smoker     Smokeless tobacco: Never Used     Tobacco comment: <1 year of smoking, <1ppd over 30 years ago   Vaping Use     Vaping Use: Never used   Substance and Sexual Activity     Alcohol use: Yes     Alcohol/week: 0.0 standard drinks     Comment: 1-2 drinks per week-wine, has cut down     Drug use: No     Sexual activity: Yes     Partners: Female     Birth control/protection: Male Surgical     Comment: vasectomy   Other Topics Concern      Service No     Blood Transfusions No     Caffeine Concern Yes     Comment: 2 cups per day     Occupational Exposure No     Comment: in sales     Hobby Hazards Yes     Comment: scuba diving every few years     Sleep Concern Yes     Comment: night sweats for last one month-soaks sheets,  no cp, no palpitaion     Stress Concern Yes     Comment: some stress work busy and manageable     Weight Concern No     Special Diet Yes     Back Care No     Exercise Yes     Comment: 3 times per week at least runns and lifting weight and plays basket ball and coaches one as well     Bike Helmet No     Seat Belt Yes     Self-Exams Yes     Parent/sibling w/ CABG, MI or angioplasty before 65F 55M? 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,     Social Determinants of Health     Financial Resource Strain: Not on file   Food Insecurity: Not on file   Transportation Needs: Not on file   Physical Activity: Not on file   Stress: Not on file   Social Connections: Not on file   Intimate Partner Violence: Not on file   Housing Stability: Not on file       Family History     Family History   Problem Relation Age of Onset     Family History Negative Mother         alive 70     Thyroid Disease Mother         low thyroid     Family History Negative Father         alive 70     Family History Negative Sister         alive 40     Family History Negative Maternal Grandmother         alive-had ovarina cancer in her 50's age     Cerebrovascular Disease Maternal Grandmother      Diabetes Maternal Grandfather         late in life     Diabetes Paternal Grandmother      Cerebrovascular Disease Paternal Grandmother      Neurologic Disorder Paternal Grandmother         RLS     Family History Negative Paternal Grandfather      Cancer - colorectal No family hx of      Prostate Cancer No family hx of      C.A.D. No family hx of        ROS     12 ROS completed. Pertinent negative and positive in HPI.    Physical Exam   There were no vitals taken for this visit.     GENERAL: Healthy, alert and no distress  EYES: Eyes grossly normal to inspection.  No discharge or erythema, or obvious scleral/conjunctival abnormalities.  RESP: No audible wheeze, cough, or visible cyanosis.  No visible retractions or increased work of breathing.    SKIN: Visible skin clear. No significant rash, abnormal pigmentation or lesions.  NEURO: Cranial nerves grossly intact.  Mentation and speech appropriate for age.  PSYCH: Mentation appears normal, affect normal/bright, judgement and insight intact, normal speech and appearance well-groomed.      Labs/Imaging     Pertinent Labs were reviewed and updated in [facility].  Radiology Results were  reviewed and updated  in EPIC.    Summary of recent findings:     TSH   Date Value Ref Range Status   09/27/2021 6.34 (H) 0.40 - 4.00 mU/L Final   08/21/2021 0.18 (L) 0.40 - 4.00 mU/L Final   10/06/2020 1.97 0.40 - 4.00 mU/L Final   12/03/2019 5.54 (H) 0.40 - 4.00 mU/L Final   08/06/2019 4.55 (H) 0.40 - 4.00 mU/L Final   12/19/2018 1.94 0.40 - 4.00 mU/L Final   04/04/2018 2.83 0.40 - 4.00 mU/L Final     T4 Free   Date Value Ref Range Status   12/03/2019 1.09 0.76 - 1.46 ng/dL Final   08/06/2019 0.95 0.76 - 1.46 ng/dL Final   12/26/2017 1.42 0.76 - 1.46 ng/dL Final   10/23/2017 1.17 0.76 - 1.46 ng/dL Final   01/31/2017 1.25 0.76 - 1.46 ng/dL Final     Free T4   Date Value Ref Range Status   09/27/2021 1.02 0.76 - 1.46 ng/dL Final   08/21/2021 1.61 (H) 0.76 - 1.46 ng/dL Final       I personally reviewed the patient's outside records from [facility] EMR. Summary of pertinent findings in [facility].      Impression / Plan     1. Subclinical hypothyroidism.    He has been on LT4 replacement since 2014. He was worried about the most recent up and down in TFT. There is no clear etiology for TFT changes, could be related toa appropriate way to med administration and gut absorption/bioavailability. He has been now on 150 mcg taking it on empty stomach sperate from any meds or vitamins since 9/2021. He is clinically euthyroid. Plan to repeat TFT now. He was instructed to stop his multivitamin 1 week prior to labs in case it has biotin in it.    2. Morning joint pain and stiffness  Checking LIBERTY, follow up with PCP.    Test and/or medications prescribed today:  Orders Placed This Encounter   Procedures     TSH     T4 free     Tissue transglutaminase antibody IgA     IgA     Anti Nuclear Saskia IgG by IFA with Reflex         Follow up: *1year       Hind Alameddine, MD  Endocrinology, Diabetes and Metabolism  [facility]

## 2021-12-02 NOTE — PROGRESS NOTES
Endocrinology Clinic Visit 12/2/2021    NAME:  Neymar Traore  PCP:  MalinaJames hernandes  MRN:  [id number]  Reason for Consult:  Abnormal thyroid labs  Requesting Provider:  James Radford    Video-Visit Details     Type of service:  Video Visit     Video Start Time: 9:38 am  Video End Time: 10:14 am     I spent a total of 70 minutes on the date of encounter reviewing medical records, evaluating the patient, coordinating care and documenting in the EHR, as detailed above.        Platform used for Video Visit: Live[employer]        Chief Complaint     Chief Complaint   Patient presents with     Video Visit       History of Present Illness     Neymar rTaore is a 56 year old male who is seen in clinic for abnormal thyroid labs    Patient had the following labs checked:   ENDO THYROID LABS-[facility] Latest Ref Rng & Units 9/27/2021 8/21/2021   TSH 0.40 - 4.00 mU/L 6.34 (H) 0.18 (L)   FREE T4 0.76 - 1.46 ng/dL 1.02 1.61 (H)       He was diagnosed with subclinical hypothyroidism on 9/2014, TSH was 8.61 and free T4 was 0.88.  He reported no overt hypothyroidism symptoms at that time.  He was started on LT 4 replacement.  He has been on LT 4 replacement since with doses ranging 150 to 175 mcg daily.  Most recently he has been on 150 mcg daily for several months.  On August 2021, he had an episode of chest pain and palpitation while exercising, cardiac etiology was ruled out, TFT showed mild hyperthyroidism.  He was not sure if he was mixing his doses between 175 and 150 LT 4 replacement.  He continued to take 150 150 MCG daily.  Repeat TFT with TSH of 6.34 and free T4 of 1.02.  Since then he started multivitamin and he was instructed to take his levothyroxine at an empty stomach with no other medication.      Symptoms of thyroid disease:  The patient reports: He gained 5-7 lbs since august.     The patient denies any denies fatigue, weight changes, heat/cold intolerance, bowel/skin changes or CVS symptoms.    No recent history of  febrile illness with neck pain or sore throat.     He denied any GI symptoms, no concern for malabsorption or celiac. On ROS he reported joint pain and stiffness especially in the morning, last AN was negative in 2012.    Family history of thyroid disease: Yes: mother, aunt have hypothyroidism  Family history of thyroid cancer: Yes: thyroid cancer.  Personal history of high-dose radiation especially in childhood: No    Problem List     Patient Active Problem List   Diagnosis     BPH (benign prostatic hypertrophy)     CHERISE (obstructive sleep apnea)     Hypothyroidism     Pulmonary nodules     Cervical stenosis of spine     Thyroid nodule        Medications     Current Outpatient Medications   Medication     cetirizine (ZYRTEC) 10 MG tablet     levothyroxine (SYNTHROID/LEVOTHROID) 150 MCG tablet     tamsulosin (FLOMAX) 0.4 MG capsule     No current facility-administered medications for this visit.        Allergies     Allergies   Allergen Reactions     Codeine      Nausea and Vomiting       Medical / Surgical History     Past Medical History:   Diagnosis Date     Arthritis 9/2015    Right knee     BPH (benign prostatic hyperplasia)      Sleep apnea     no CPAP     Thyroid disease      Past Surgical History:   Procedure Laterality Date     ARTHROSCOPY KNEE RT/LT      Numerous surgeries on Rt. Knee, R  Patella Fracture 1981     COLONOSCOPY  2010 and 2015 and 2020     Microscopic Hematuria      Negative Cystoscopy and CT scan in 2009     Pulmonary Nodules      No change on repeat CT scan in 2009, no repeat imaging at this point     SEPTOPLASTY, TURBINOPLASTY, COMBINED N/A 12/17/2014    Procedure: COMBINED SEPTOPLASTY, TURBINOPLASTY;  Surgeon: Mingo Pelayo MD;  Location: [facility] OR     SURGICAL HISTORY OF -       Lt. Elbow tendon surgery     TONSILLECTOMY       VASECTOMY         Social History     Social History     Socioeconomic History     Marital status:      Spouse name: Not on file     Number of children: Not on  file     Years of education: Not on file     Highest education level: Not on file   Occupational History     Not on file   Tobacco Use     Smoking status: Never Smoker     Smokeless tobacco: Never Used     Tobacco comment: <1 year of smoking, <1ppd over 30 years ago   Vaping Use     Vaping Use: Never used   Substance and Sexual Activity     Alcohol use: Yes     Alcohol/week: 0.0 standard drinks     Comment: 1-2 drinks per week-wine, has cut down     Drug use: No     Sexual activity: Yes     Partners: Female     Birth control/protection: Male Surgical     Comment: vasectomy   Other Topics Concern      Service No     Blood Transfusions No     Caffeine Concern Yes     Comment: 2 cups per day     Occupational Exposure No     Comment: in sales     Hobby Hazards Yes     Comment: scuba diving every few years     Sleep Concern Yes     Comment: night sweats for last one month-soaks sheets,  no cp, no palpitaion     Stress Concern Yes     Comment: some stress work busy and manageable     Weight Concern No     Special Diet Yes     Back Care No     Exercise Yes     Comment: 3 times per week at least runns and lifting weight and plays basket ball and coaches one as well     Bike Helmet No     Seat Belt Yes     Self-Exams Yes     Parent/sibling w/ CABG, MI or angioplasty before 65F 55M? 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 and youngest 6 yo,     Social Determinants of Health     Financial Resource Strain: Not on file   Food Insecurity: Not on file   Transportation Needs: Not on file   Physical Activity: Not on file   Stress: Not on file   Social Connections: Not on file   Intimate Partner Violence: Not on file   Housing Stability: Not on file       Family History     Family History   Problem Relation Age of Onset     Family History Negative Mother         alive 70     Thyroid Disease Mother         low thyroid     Family History Negative Father          alive 70     Family History Negative Sister         alive 40     Family History Negative Maternal Grandmother         alive-had ovarina cancer in her 50's age     Cerebrovascular Disease Maternal Grandmother      Diabetes Maternal Grandfather         late in life     Diabetes Paternal Grandmother      Cerebrovascular Disease Paternal Grandmother      Neurologic Disorder Paternal Grandmother         RLS     Family History Negative Paternal Grandfather      Cancer - colorectal No family hx of      Prostate Cancer No family hx of      C.A.D. No family hx of        ROS     12 ROS completed. Pertinent negative and positive in HPI.    Physical Exam   There were no vitals taken for this visit.     GENERAL: Healthy, alert and no distress  EYES: Eyes grossly normal to inspection.  No discharge or erythema, or obvious scleral/conjunctival abnormalities.  RESP: No audible wheeze, cough, or visible cyanosis.  No visible retractions or increased work of breathing.    SKIN: Visible skin clear. No significant rash, abnormal pigmentation or lesions.  NEURO: Cranial nerves grossly intact.  Mentation and speech appropriate for age.  PSYCH: Mentation appears normal, affect normal/bright, judgement and insight intact, normal speech and appearance well-groomed.      Labs/Imaging     Pertinent Labs were reviewed and updated in [facility].  Radiology Results were  reviewed and updated in EPIC.    Summary of recent findings:     TSH   Date Value Ref Range Status   09/27/2021 6.34 (H) 0.40 - 4.00 mU/L Final   08/21/2021 0.18 (L) 0.40 - 4.00 mU/L Final   10/06/2020 1.97 0.40 - 4.00 mU/L Final   12/03/2019 5.54 (H) 0.40 - 4.00 mU/L Final   08/06/2019 4.55 (H) 0.40 - 4.00 mU/L Final   12/19/2018 1.94 0.40 - 4.00 mU/L Final   04/04/2018 2.83 0.40 - 4.00 mU/L Final     T4 Free   Date Value Ref Range Status   12/03/2019 1.09 0.76 - 1.46 ng/dL Final   08/06/2019 0.95 0.76 - 1.46 ng/dL Final   12/26/2017 1.42 0.76 - 1.46 ng/dL Final   10/23/2017 1.17  0.76 - 1.46 ng/dL Final   01/31/2017 1.25 0.76 - 1.46 ng/dL Final     Free T4   Date Value Ref Range Status   09/27/2021 1.02 0.76 - 1.46 ng/dL Final   08/21/2021 1.61 (H) 0.76 - 1.46 ng/dL Final       I personally reviewed the patient's outside records from [facility] EMR. Summary of pertinent findings in HPI.      Impression / Plan     1. Subclinical hypothyroidism.    He has been on LT4 replacement since 2014. He was worried about the most recent up and down in TFT. There is no clear etiology for TFT changes, could be related toa appropriate way to med administration and gut absorption/bioavailability. He has been now on 150 mcg taking it on empty stomach sperate from any meds or vitamins since 9/2021. He is clinically euthyroid. Plan to repeat TFT now. He was instructed to stop his multivitamin 1 week prior to labs in case it has biotin in it.    2. Morning joint pain and stiffness  Checking LIBERTY, follow up with PCP.    Test and/or medications prescribed today:  Orders Placed This Encounter   Procedures     TSH     T4 free     Tissue transglutaminase antibody IgA     IgA     Anti Nuclear Saskia IgG by IFA with Reflex         Follow up: *1year       Jonh Martinez MD  Endocrinology, Diabetes and Metabolism  [facility]

## 2021-12-10 ENCOUNTER — LAB (OUTPATIENT)
Dept: LAB | Facility: CLINIC | Age: 56
End: 2021-12-10
Payer: COMMERCIAL

## 2021-12-10 DIAGNOSIS — E03.9 HYPOTHYROIDISM, UNSPECIFIED TYPE: ICD-10-CM

## 2021-12-10 PROCEDURE — 84443 ASSAY THYROID STIM HORMONE: CPT

## 2021-12-10 PROCEDURE — 84439 ASSAY OF FREE THYROXINE: CPT

## 2021-12-10 PROCEDURE — 86038 ANTINUCLEAR ANTIBODIES: CPT

## 2021-12-10 PROCEDURE — 83516 IMMUNOASSAY NONANTIBODY: CPT

## 2021-12-10 PROCEDURE — 36415 COLL VENOUS BLD VENIPUNCTURE: CPT

## 2021-12-10 PROCEDURE — 82784 ASSAY IGA/IGD/IGG/IGM EACH: CPT

## 2021-12-13 LAB
ANA SER QL IF: NEGATIVE
IGA SERPL-MCNC: 135 MG/DL (ref 84–499)
T4 FREE SERPL-MCNC: 1.19 NG/DL (ref 0.76–1.46)
TSH SERPL DL<=0.005 MIU/L-ACNC: 3.26 MU/L (ref 0.4–4)
TTG IGA SER-ACNC: 0.6 U/ML

## 2021-12-21 ENCOUNTER — DOCUMENTATION ONLY (OUTPATIENT)
Dept: LAB | Facility: CLINIC | Age: 56
End: 2021-12-21
Payer: COMMERCIAL

## 2021-12-21 DIAGNOSIS — E03.9 HYPOTHYROIDISM, UNSPECIFIED TYPE: Primary | ICD-10-CM

## 2021-12-21 NOTE — PROGRESS NOTES
Patient has an upcoming lab appointment and currently has no active orders. Please place orders as needed. Thanks!  -Delvis DOBBINS

## 2021-12-27 ENCOUNTER — LAB (OUTPATIENT)
Dept: LAB | Facility: CLINIC | Age: 56
End: 2021-12-27
Payer: COMMERCIAL

## 2021-12-27 DIAGNOSIS — E03.9 HYPOTHYROIDISM, UNSPECIFIED TYPE: ICD-10-CM

## 2021-12-27 LAB — TSH SERPL DL<=0.005 MIU/L-ACNC: 0.67 MU/L (ref 0.4–4)

## 2021-12-27 PROCEDURE — 36415 COLL VENOUS BLD VENIPUNCTURE: CPT

## 2021-12-27 PROCEDURE — 84443 ASSAY THYROID STIM HORMONE: CPT

## 2021-12-27 NOTE — RESULT ENCOUNTER NOTE
Archie Leiva,    I just wanted to let you know that your lab results have been reviewed and are attached.    - James Radford MD is currently out of the office. I am his partner.  - Your TSH was normal, indicating that you are on the correct dose of thyroid medication.    Please let me know if you have any questions and have a great week!    Sincerely,    Virgie Awad PA-C    [facility]  [address] [place] [address]  Clinic Phone: [phone]

## 2022-01-17 ENCOUNTER — TELEPHONE (OUTPATIENT)
Dept: BEHAVIORAL HEALTH | Facility: CLINIC | Age: 57
End: 2022-01-17
Payer: COMMERCIAL

## 2022-01-19 ENCOUNTER — TELEPHONE (OUTPATIENT)
Dept: BEHAVIORAL HEALTH | Facility: CLINIC | Age: 57
End: 2022-01-19
Payer: COMMERCIAL

## 2022-01-19 ENCOUNTER — HOSPITAL ENCOUNTER (OUTPATIENT)
Dept: BEHAVIORAL HEALTH | Facility: CLINIC | Age: 57
Discharge: HOME | End: 2022-01-19
Attending: FAMILY MEDICINE | Admitting: FAMILY MEDICINE
Payer: COMMERCIAL

## 2022-01-19 DIAGNOSIS — F43.9 STRESS: Primary | ICD-10-CM

## 2022-01-19 PROCEDURE — 90791 PSYCH DIAGNOSTIC EVALUATION: CPT | Mod: GT,95 | Performed by: COUNSELOR

## 2022-01-19 ASSESSMENT — COLUMBIA-SUICIDE SEVERITY RATING SCALE - C-SSRS
TOTAL  NUMBER OF INTERRUPTED ATTEMPTS LIFETIME: NO
6. HAVE YOU EVER DONE ANYTHING, STARTED TO DO ANYTHING, OR PREPARED TO DO ANYTHING TO END YOUR LIFE?: NO
TOTAL  NUMBER OF ABORTED OR SELF INTERRUPTED ATTEMPTS PAST LIFETIME: NO
2. HAVE YOU ACTUALLY HAD ANY THOUGHTS OF KILLING YOURSELF?: NO
ATTEMPT LIFETIME: NO
TOTAL  NUMBER OF ABORTED OR SELF INTERRUPTED ATTEMPTS PAST 3 MONTHS: NO
4. HAVE YOU HAD THESE THOUGHTS AND HAD SOME INTENTION OF ACTING ON THEM?: NO
5. HAVE YOU STARTED TO WORK OUT OR WORKED OUT THE DETAILS OF HOW TO KILL YOURSELF? DO YOU INTEND TO CARRY OUT THIS PLAN?: NO
1. IN THE PAST MONTH, HAVE YOU WISHED YOU WERE DEAD OR WISHED YOU COULD GO TO SLEEP AND NOT WAKE UP?: NO
5. HAVE YOU STARTED TO WORK OUT OR WORKED OUT THE DETAILS OF HOW TO KILL YOURSELF? DO YOU INTEND TO CARRY OUT THIS PLAN?: NO
2. HAVE YOU ACTUALLY HAD ANY THOUGHTS OF KILLING YOURSELF LIFETIME?: NO
TOTAL  NUMBER OF INTERRUPTED ATTEMPTS PAST 3 MONTHS: NO
3. HAVE YOU BEEN THINKING ABOUT HOW YOU MIGHT KILL YOURSELF?: NO
6. HAVE YOU EVER DONE ANYTHING, STARTED TO DO ANYTHING, OR PREPARED TO DO ANYTHING TO END YOUR LIFE?: NO
ATTEMPT PAST THREE MONTHS: NO
1. IN THE PAST MONTH, HAVE YOU WISHED YOU WERE DEAD OR WISHED YOU COULD GO TO SLEEP AND NOT WAKE UP?: NO
4. HAVE YOU HAD THESE THOUGHTS AND HAD SOME INTENTION OF ACTING ON THEM?: NO

## 2022-01-19 ASSESSMENT — ANXIETY QUESTIONNAIRES
6. BECOMING EASILY ANNOYED OR IRRITABLE: SEVERAL DAYS
GAD7 TOTAL SCORE: 7
5. BEING SO RESTLESS THAT IT IS HARD TO SIT STILL: NOT AT ALL
2. NOT BEING ABLE TO STOP OR CONTROL WORRYING: NEARLY EVERY DAY
IF YOU CHECKED OFF ANY PROBLEMS ON THIS QUESTIONNAIRE, HOW DIFFICULT HAVE THESE PROBLEMS MADE IT FOR YOU TO DO YOUR WORK, TAKE CARE OF THINGS AT HOME, OR GET ALONG WITH OTHER PEOPLE: NOT DIFFICULT AT ALL
1. FEELING NERVOUS, ANXIOUS, OR ON EDGE: MORE THAN HALF THE DAYS
7. FEELING AFRAID AS IF SOMETHING AWFUL MIGHT HAPPEN: SEVERAL DAYS
3. WORRYING TOO MUCH ABOUT DIFFERENT THINGS: NOT AT ALL

## 2022-01-19 ASSESSMENT — PATIENT HEALTH QUESTIONNAIRE - PHQ9
5. POOR APPETITE OR OVEREATING: NOT AT ALL
SUM OF ALL RESPONSES TO PHQ QUESTIONS 1-9: 7
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
## 2022-01-19 NOTE — TELEPHONE ENCOUNTER
Patient has a virtual  eval today, 1/19/2022 at 1100. A phone call was made out to patient to check them in for this appointment, but no answer so a voice message was left for patient to return call.

## 2022-01-20 ASSESSMENT — ANXIETY QUESTIONNAIRES: GAD7 TOTAL SCORE: 7

## 2022-02-15 ENCOUNTER — TRANSFERRED RECORDS (OUTPATIENT)
Dept: HEALTH INFORMATION MANAGEMENT | Facility: CLINIC | Age: 57
End: 2022-02-15
Payer: COMMERCIAL

## 2022-03-17 ENCOUNTER — TRANSFERRED RECORDS (OUTPATIENT)
Dept: HEALTH INFORMATION MANAGEMENT | Facility: CLINIC | Age: 57
End: 2022-03-17
Payer: COMMERCIAL

## 2022-04-13 ENCOUNTER — TRANSFERRED RECORDS (OUTPATIENT)
Dept: HEALTH INFORMATION MANAGEMENT | Facility: CLINIC | Age: 57
End: 2022-04-13
Payer: COMMERCIAL

## 2022-04-28 ENCOUNTER — TRANSFERRED RECORDS (OUTPATIENT)
Dept: HEALTH INFORMATION MANAGEMENT | Facility: CLINIC | Age: 57
End: 2022-04-28
Payer: COMMERCIAL

## 2022-05-24 ENCOUNTER — IMMUNIZATION (OUTPATIENT)
Dept: FAMILY MEDICINE | Facility: CLINIC | Age: 57
End: 2022-05-24

## 2022-05-24 PROCEDURE — 0054A COVID-19,PF,PFIZER (12+ YRS): CPT

## 2022-05-24 PROCEDURE — 91305 COVID-19,PF,PFIZER (12+ YRS): CPT

## 2022-06-06 ENCOUNTER — LAB (OUTPATIENT)
Dept: URGENT CARE | Facility: URGENT CARE | Age: 57
End: 2022-06-06
Attending: FAMILY MEDICINE
Payer: COMMERCIAL

## 2022-06-06 DIAGNOSIS — Z20.822 ENCOUNTER FOR LABORATORY TESTING FOR COVID-19 VIRUS: ICD-10-CM

## 2022-06-06 LAB — SARS-COV-2 RNA RESP QL NAA+PROBE: NEGATIVE

## 2022-06-06 PROCEDURE — U0003 INFECTIOUS AGENT DETECTION BY NUCLEIC ACID (DNA OR RNA); SEVERE ACUTE RESPIRATORY SYNDROME CORONAVIRUS 2 (SARS-COV-2) (CORONAVIRUS DISEASE [COVID-19]), AMPLIFIED PROBE TECHNIQUE, MAKING USE OF HIGH THROUGHPUT TECHNOLOGIES AS DESCRIBED BY CMS-2020-01-R: HCPCS

## 2022-06-06 PROCEDURE — U0005 INFEC AGEN DETEC AMPLI PROBE: HCPCS

## 2022-07-14 SDOH — ECONOMIC STABILITY: FOOD INSECURITY: WITHIN THE PAST 12 MONTHS, THE FOOD YOU BOUGHT JUST DIDN'T LAST AND YOU DIDN'T HAVE MONEY TO GET MORE.: NEVER TRUE

## 2022-07-14 SDOH — ECONOMIC STABILITY: TRANSPORTATION INSECURITY
IN THE PAST 12 MONTHS, HAS LACK OF TRANSPORTATION KEPT YOU FROM MEETINGS, WORK, OR FROM GETTING THINGS NEEDED FOR DAILY LIVING?: NO

## 2022-07-14 SDOH — ECONOMIC STABILITY: INCOME INSECURITY: IN THE LAST 12 MONTHS, WAS THERE A TIME WHEN YOU WERE NOT ABLE TO PAY THE MORTGAGE OR RENT ON TIME?: NO

## 2022-07-14 SDOH — ECONOMIC STABILITY: INCOME INSECURITY: HOW HARD IS IT FOR YOU TO PAY FOR THE VERY BASICS LIKE FOOD, HOUSING, MEDICAL CARE, AND HEATING?: NOT HARD AT ALL

## 2022-07-14 SDOH — HEALTH STABILITY: PHYSICAL HEALTH: ON AVERAGE, HOW MANY DAYS PER WEEK DO YOU ENGAGE IN MODERATE TO STRENUOUS EXERCISE (LIKE A BRISK WALK)?: 4 DAYS

## 2022-07-14 SDOH — ECONOMIC STABILITY: TRANSPORTATION INSECURITY
IN THE PAST 12 MONTHS, HAS THE LACK OF TRANSPORTATION KEPT YOU FROM MEDICAL APPOINTMENTS OR FROM GETTING MEDICATIONS?: NO

## 2022-07-14 SDOH — ECONOMIC STABILITY: FOOD INSECURITY: WITHIN THE PAST 12 MONTHS, YOU WORRIED THAT YOUR FOOD WOULD RUN OUT BEFORE YOU GOT MONEY TO BUY MORE.: NEVER TRUE

## 2022-07-14 SDOH — HEALTH STABILITY: PHYSICAL HEALTH: ON AVERAGE, HOW MANY MINUTES DO YOU ENGAGE IN EXERCISE AT THIS LEVEL?: 30 MIN

## 2022-07-14 ASSESSMENT — SOCIAL DETERMINANTS OF HEALTH (SDOH)
IN A TYPICAL WEEK, HOW MANY TIMES DO YOU TALK ON THE PHONE WITH FAMILY, FRIENDS, OR NEIGHBORS?: ONCE A WEEK
HOW OFTEN DO YOU GET TOGETHER WITH FRIENDS OR RELATIVES?: ONCE A WEEK
HOW OFTEN DO YOU ATTEND CHURCH OR RELIGIOUS SERVICES?: NEVER
DO YOU BELONG TO ANY CLUBS OR ORGANIZATIONS SUCH AS CHURCH GROUPS UNIONS, FRATERNAL OR ATHLETIC GROUPS, OR SCHOOL GROUPS?: NO

## 2022-07-14 ASSESSMENT — LIFESTYLE VARIABLES
HOW MANY STANDARD DRINKS CONTAINING ALCOHOL DO YOU HAVE ON A TYPICAL DAY: 1 OR 2
AUDIT-C TOTAL SCORE: 4
HOW OFTEN DO YOU HAVE SIX OR MORE DRINKS ON ONE OCCASION: NEVER
SKIP TO QUESTIONS 9-10: 1
HOW OFTEN DO YOU HAVE A DRINK CONTAINING ALCOHOL: 4 OR MORE TIMES A WEEK

## 2022-07-19 ENCOUNTER — OFFICE VISIT (OUTPATIENT)
Dept: FAMILY MEDICINE | Facility: CLINIC | Age: 57
End: 2022-07-19
Payer: COMMERCIAL

## 2022-07-19 VITALS
WEIGHT: 184 LBS | HEART RATE: 73 BPM | BODY MASS INDEX: 27.25 KG/M2 | TEMPERATURE: 97.9 F | HEIGHT: 69 IN | RESPIRATION RATE: 14 BRPM | SYSTOLIC BLOOD PRESSURE: 107 MMHG | DIASTOLIC BLOOD PRESSURE: 75 MMHG

## 2022-07-19 DIAGNOSIS — Z01.818 PREOP GENERAL PHYSICAL EXAM: Primary | ICD-10-CM

## 2022-07-19 DIAGNOSIS — G47.33 OSA (OBSTRUCTIVE SLEEP APNEA): ICD-10-CM

## 2022-07-19 PROCEDURE — 99213 OFFICE O/P EST LOW 20 MIN: CPT | Performed by: FAMILY MEDICINE

## 2022-07-19 ASSESSMENT — ANXIETY QUESTIONNAIRES
5. BEING SO RESTLESS THAT IT IS HARD TO SIT STILL: NOT AT ALL
2. NOT BEING ABLE TO STOP OR CONTROL WORRYING: NOT AT ALL
1. FEELING NERVOUS, ANXIOUS, OR ON EDGE: NOT AT ALL
6. BECOMING EASILY ANNOYED OR IRRITABLE: SEVERAL DAYS
3. WORRYING TOO MUCH ABOUT DIFFERENT THINGS: NOT AT ALL
7. FEELING AFRAID AS IF SOMETHING AWFUL MIGHT HAPPEN: NOT AT ALL
4. TROUBLE RELAXING: NOT AT ALL
GAD7 TOTAL SCORE: 1
7. FEELING AFRAID AS IF SOMETHING AWFUL MIGHT HAPPEN: NOT AT ALL
8. IF YOU CHECKED OFF ANY PROBLEMS, HOW DIFFICULT HAVE THESE MADE IT FOR YOU TO DO YOUR WORK, TAKE CARE OF THINGS AT HOME, OR GET ALONG WITH OTHER PEOPLE?: NOT DIFFICULT AT ALL
GAD7 TOTAL SCORE: 1
GAD7 TOTAL SCORE: 1

## 2022-07-19 ASSESSMENT — PATIENT HEALTH QUESTIONNAIRE - PHQ9
10. IF YOU CHECKED OFF ANY PROBLEMS, HOW DIFFICULT HAVE THESE PROBLEMS MADE IT FOR YOU TO DO YOUR WORK, TAKE CARE OF THINGS AT HOME, OR GET ALONG WITH OTHER PEOPLE: NOT DIFFICULT AT ALL
SUM OF ALL RESPONSES TO PHQ QUESTIONS 1-9: 2
SUM OF ALL RESPONSES TO PHQ QUESTIONS 1-9: 2

## 2022-07-19 NOTE — PATIENT INSTRUCTIONS
Preparing for Your Surgery  Getting started  A nurse will call you to review your health history and instructions. They will give you an arrival time based on your scheduled surgery time. Please be ready to share:    Your doctor's clinic name and phone number    Your medical, surgical and anesthesia history    A list of allergies and sensitivities    A list of medicines, including herbal treatments and over-the-counter drugs    Whether the patient has a legal guardian (ask how to send us the papers in advance)  Please tell us if you're pregnant--or if there's any chance you might be pregnant. Some surgeries may injure a fetus (unborn baby), so they require a pregnancy test. Surgeries that are safe for a fetus don't always need a test, and you can choose whether to have one.   If you have a child who's having surgery, please ask for a copy of Preparing for Your Child's Surgery.    Preparing for surgery    Within 30 days of surgery: Have a pre-op exam (sometimes called an H&P, or History and Physical). This can be done at a clinic or pre-operative center.  ? If you're having a , you may not need this exam. Talk to your care team.    At your pre-op exam, talk to your care team about all medicines you take. If you need to stop any medicines before surgery, ask when to start taking them again.  ? We do this for your safety. Many medicines can make you bleed too much during surgery. Some change how well surgery (anesthesia) drugs work.    Call your insurance company to let them know you're having surgery. (If you don't have insurance, call [phone].)    Call your clinic if there's any change in your health. This includes signs of a cold or flu (sore throat, runny nose, cough, rash, fever). It also includes a scrape or scratch near the surgery site.    If you have questions on the day of surgery, call your hospital or surgery center.  COVID testing  You may need to be tested for COVID-19 before having  surgery. If so, we will give you instructions.  Eating and drinking guidelines  For your safety: Unless your surgeon tells you otherwise, follow the guidelines below.    Eat and drink as usual until 8 hours before surgery. After that, no food or milk.    Drink clear liquids until 2 hours before surgery. These are liquids you can see through, like water, Gatorade and Propel Water. You may also have black coffee and tea (no cream or milk).    Nothing by mouth within 2 hours of surgery. This includes gum, candy and breath mints.    If you drink alcohol: Stop drinking it the night before surgery.    If your care team tells you to take medicine on the morning of surgery, it's okay to take it with a sip of water.  Preventing infection    Shower or bathe the night before and morning of your surgery. Follow the instructions your clinic gave you. (If no instructions, use regular soap.)    Don't shave or clip hair near your surgery site. We'll remove the hair if needed.    Don't smoke or vape the morning of surgery. You may chew nicotine gum up to 2 hours before surgery. A nicotine patch is okay.  ? Note: Some surgeries require you to completely quit smoking and nicotine. Check with your surgeon.    Your care team will make every effort to keep you safe from infection. We will:  ? Clean our hands often with soap and water (or an alcohol-based hand rub).  ? Clean the skin at your surgery site with a special soap that kills germs.  ? Give you a special gown to keep you warm. (Cold raises the risk of infection.)  ? Wear special hair covers, masks, gowns and gloves during surgery.  ? Give antibiotic medicine, if prescribed. Not all surgeries need antibiotics.  What to bring on the day of surgery    Photo ID and insurance card    Copy of your health care directive, if you have one    Glasses and hearing aides (bring cases)  ? You can't wear contacts during surgery    Inhaler and eye drops, if you use them (tell us about these when  you arrive)    CPAP machine or breathing device, if you use them    A few personal items, if spending the night    If you have . . .  ? A pacemaker, ICD (cardiac defibrillator) or other implant: Bring the ID card.  ? An implanted stimulator: Bring the remote control.  ? A legal guardian: Bring a copy of the certified (court-stamped) guardianship papers.  Please remove any jewelry, including body piercings. Leave jewelry and other valuables at home.  If you're going home the day of surgery    You must have a responsible adult drive you home. They should stay with you overnight as well.    If you don't have someone to stay with you, and you aren't safe to go home alone, we may keep you overnight. Insurance often won't pay for this.  After surgery  If it's hard to control your pain or you need more pain medicine, please call your surgeon's office.  Questions?   If you have any questions for your care team, list them here: _________________________________________________________________________________________________________________________________________________________________________ ____________________________________ ____________________________________ ____________________________________  For informational purposes only. Not to replace the advice of your health care provider. Copyright   2003, 2019 [facility]. All rights reserved. Clinically reviewed by Rissa Madrid MD. [place] [id number] - REV 07/21.

## 2022-07-19 NOTE — PROGRESS NOTES
[facility]  [address] [facility] [address]  Phone: [phone]  Primary Provider: James Radford  Pre-op Performing Provider: ANIA MCRAE      PREOPERATIVE EVALUATION:  Today's date: 7/19/2022    Neymar Traore is a 57 year old male who presents for a preoperative evaluation.    Surgical Information:  Surgery/Procedure: exploratory for possible throat stimulator  Surgery Location: Surgery specialty center [facility]  Surgeon: Beto carrizales  Surgery Date: 7-25-22  Time of Surgery: tbd  Where patient plans to recover: At home with family  Fax number for surgical facility: [phone]    Type of Anesthesia Anticipated: General    Assessment & Plan     The proposed surgical procedure is considered LOW risk.    Preop general physical exam  Undergoing low risk surgery   Physical activity greater than 4 mets .  Low perioperative cardiac risk .  Patient optimized for procedure .  HCERISE (obstructive sleep apnea)           Risks and Recommendations:  The patient has the following additional risks and recommendations for perioperative complications:   - No identified additional risk factors other than previously addressed    Medication Instructions:  Patient is to take all scheduled medications on the day of surgery    RECOMMENDATION:  APPROVAL GIVEN to proceed with proposed procedure, without further diagnostic evaluation.            Subjective     HPI related to upcoming procedure:     Preop Questions 7/14/2022   1. Have you ever had a heart attack or stroke? No   2. Have you ever had surgery on your heart or blood vessels, such as a stent placement, a coronary artery bypass, or surgery on an artery in your head, neck, heart, or legs? No   3. Do you have chest pain with activity? No   4. Do you have a history of  heart failure? No   5. Do you currently have a cold, bronchitis or symptoms of other infection? No   6. Do you have a cough, shortness of breath, or wheezing? No    7. Do you or anyone in your family have previous history of blood clots? No   8. Do you or does anyone in your family have a serious bleeding problem such as prolonged bleeding following surgeries or cuts? No   9. Have you ever had problems with anemia or been told to take iron pills? No   10. Have you had any abnormal blood loss such as black, tarry or bloody stools? No   11. Have you ever had a blood transfusion? No   12. Are you willing to have a blood transfusion if it is medically needed before, during, or after your surgery? Yes   13. Have you or any of your relatives ever had problems with anesthesia? No   14. Do you have sleep apnea, excessive snoring or daytime drowsiness? YES -    14a. Do you have a CPAP machine? Yes   15. Do you have any artifical heart valves or other implanted medical devices like a pacemaker, defibrillator, or continuous glucose monitor? No   16. Do you have artificial joints? No   17. Are you allergic to latex? No       Health Care Directive:  Patient does not have a Health Care Directive or Living Will:         Review of Systems  CONSTITUTIONAL: NEGATIVE for fever, chills, change in weight  INTEGUMENTARY/SKIN: NEGATIVE for worrisome rashes, moles or lesions  EYES: NEGATIVE for vision changes or irritation  ENT/MOUTH: NEGATIVE for ear, mouth and throat problems  RESP: NEGATIVE for significant cough or SOB  CV: NEGATIVE for chest pain, palpitations or peripheral edema  GI: NEGATIVE for nausea, abdominal pain, heartburn, or change in bowel habits  : NEGATIVE for frequency, dysuria, or hematuria  MUSCULOSKELETAL: NEGATIVE for significant arthralgias or myalgia  NEURO: NEGATIVE for weakness, dizziness or paresthesias  ENDOCRINE: NEGATIVE for temperature intolerance, skin/hair changes  HEME: NEGATIVE for bleeding problems  PSYCHIATRIC: NEGATIVE for changes in mood or affect    Patient Active Problem List    Diagnosis Date Noted     Stress 01/19/2022     Priority: Medium     Thyroid  nodule 11/26/2019     Priority: Medium     Cervical stenosis of spine 09/30/2019     Priority: Medium     Pulmonary nodules 11/05/2016     Priority: Medium     Hypothyroidism 04/03/2015     Priority: Medium     CHERISE (obstructive sleep apnea) 09/17/2014     Priority: Medium     CPAP       BPH (benign prostatic hypertrophy) 06/29/2011     Priority: Medium      Past Medical History:   Diagnosis Date     Arthritis 9/2015    Right knee     BPH (benign prostatic hyperplasia)      Sleep apnea     no CPAP     Thyroid disease      Past Surgical History:   Procedure Laterality Date     ARTHROSCOPY KNEE RT/LT      Numerous surgeries on Rt. Knee, R  Patella Fracture 1981     COLONOSCOPY  2010 and 2015 and 2020     Microscopic Hematuria      Negative Cystoscopy and CT scan in 2009     Pulmonary Nodules      No change on repeat CT scan in 2009, no repeat imaging at this point     SEPTOPLASTY, TURBINOPLASTY, COMBINED N/A 12/17/2014    Procedure: COMBINED SEPTOPLASTY, TURBINOPLASTY;  Surgeon: Mingo Pelayo MD;  Location: [facility] OR     SURGICAL HISTORY OF -       Lt. Elbow tendon surgery     TONSILLECTOMY       VASECTOMY       Current Outpatient Medications   Medication Sig Dispense Refill     cetirizine (ZYRTEC) 10 MG tablet Take 10 mg by mouth daily  30 tablet 1     levothyroxine (SYNTHROID/LEVOTHROID) 150 MCG tablet TAKE 1 TABLET BY MOUTH DAILY 90 tablet 3     tamsulosin (FLOMAX) 0.4 MG capsule TAKE 1 CAPSULE (0.4 MG) BY MOUTH AT BEDTIME 90 capsule 3       Allergies   Allergen Reactions     Codeine      Nausea and Vomiting        Social History     Tobacco Use     Smoking status: Never Smoker     Smokeless tobacco: Never Used     Tobacco comment: <1 year of smoking, <1ppd over 30 years ago   Substance Use Topics     Alcohol use: Yes     Alcohol/week: 0.0 standard drinks     Comment: 1-2 drinks per week-wine, has cut down     Family History   Problem Relation Age of Onset     Family History Negative Mother         alive 70      "Thyroid Disease Mother         low thyroid     Family History Negative Father         alive 70     Family History Negative Sister         alive 40     Family History Negative Maternal Grandmother         alive-had ovarina cancer in her 50's age     Cerebrovascular Disease Maternal Grandmother      Diabetes Maternal Grandfather         late in life     Diabetes Paternal Grandmother      Cerebrovascular Disease Paternal Grandmother      Neurologic Disorder Paternal Grandmother         RLS     Family History Negative Paternal Grandfather      Cancer - colorectal No family hx of      Prostate Cancer No family hx of      C.A.D. No family hx of      History   Drug Use No         Objective     /75 (BP Location: Right arm, Patient Position: Sitting, Cuff Size: Adult Large)   Pulse 73   Temp 97.9  F (36.6  C) (Oral)   Resp 14   Ht 1.746 m (5' 8.75\")   Wt 83.5 kg (184 lb)   BMI 27.37 kg/m      Physical Exam    GENERAL APPEARANCE: healthy, alert and no distress     EYES: EOMI,  PERRL     HENT: ear canals and TM's normal and nose and mouth without ulcers or lesions     NECK: no adenopathy, no asymmetry, masses, or scars and thyroid normal to palpation     RESP: lungs clear to auscultation - no rales, rhonchi or wheezes     CV: regular rates and rhythm, normal S1 S2, no S3 or S4 and no murmur, click or rub     ABDOMEN:  soft, nontender, no HSM or masses and bowel sounds normal     MS: extremities normal- no gross deformities noted, no evidence of inflammation in joints, FROM in all extremities.     SKIN: no suspicious lesions or rashes     NEURO: Normal strength and tone, sensory exam grossly normal, mentation intact and speech normal     PSYCH: mentation appears normal. and affect normal/bright     LYMPHATICS: No cervical adenopathy    Recent Labs   Lab Test 08/21/21  1017   HGB 13.4         POTASSIUM 3.9   CR 1.08        Diagnostics:  No labs were ordered during this visit.   No EKG required for low " risk surgery (cataract, skin procedure, breast biopsy, etc).    Revised Cardiac Risk Index (RCRI):  The patient has the following serious cardiovascular risks for perioperative complications:   - No serious cardiac risks = 0 points     RCRI Interpretation: 0 points: Class I (very low risk - 0.4% complication rate)           Signed Electronically by: Aiyana Price MD  Copy of this evaluation report is provided to requesting physician.      Answers for HPI/ROS submitted by the patient on 7/19/2022  If you checked off any problems, how difficult have these problems made it for you to do your work, take care of things at home, or get along with other people?: Not difficult at all  PHQ9 TOTAL SCORE: 2  NATALYA 7 TOTAL SCORE: 1

## 2022-07-22 ENCOUNTER — LAB (OUTPATIENT)
Dept: LAB | Facility: CLINIC | Age: 57
End: 2022-07-22
Payer: COMMERCIAL

## 2022-07-22 DIAGNOSIS — Z20.822 ENCOUNTER FOR LABORATORY TESTING FOR COVID-19 VIRUS: ICD-10-CM

## 2022-07-22 PROCEDURE — U0005 INFEC AGEN DETEC AMPLI PROBE: HCPCS

## 2022-07-22 PROCEDURE — U0003 INFECTIOUS AGENT DETECTION BY NUCLEIC ACID (DNA OR RNA); SEVERE ACUTE RESPIRATORY SYNDROME CORONAVIRUS 2 (SARS-COV-2) (CORONAVIRUS DISEASE [COVID-19]), AMPLIFIED PROBE TECHNIQUE, MAKING USE OF HIGH THROUGHPUT TECHNOLOGIES AS DESCRIBED BY CMS-2020-01-R: HCPCS

## 2022-07-23 LAB — SARS-COV-2 RNA RESP QL NAA+PROBE: NEGATIVE

## 2022-10-16 ENCOUNTER — HEALTH MAINTENANCE LETTER (OUTPATIENT)
Age: 57
End: 2022-10-16

## 2022-11-30 ENCOUNTER — E-VISIT (OUTPATIENT)
Dept: URGENT CARE | Facility: CLINIC | Age: 57
End: 2022-11-30
Payer: COMMERCIAL

## 2022-11-30 DIAGNOSIS — B96.89 ACUTE BACTERIAL SINUSITIS: Primary | ICD-10-CM

## 2022-11-30 DIAGNOSIS — J01.90 ACUTE BACTERIAL SINUSITIS: Primary | ICD-10-CM

## 2022-11-30 PROCEDURE — 99421 OL DIG E/M SVC 5-10 MIN: CPT | Performed by: NURSE PRACTITIONER

## 2022-12-01 ENCOUNTER — TELEPHONE (OUTPATIENT)
Dept: FAMILY MEDICINE | Facility: CLINIC | Age: 57
End: 2022-12-01

## 2022-12-01 DIAGNOSIS — J32.9 OTHER SINUSITIS, UNSPECIFIED CHRONICITY: Primary | ICD-10-CM

## 2022-12-01 NOTE — PATIENT INSTRUCTIONS
Sinusitis (Antibiotic Treatment)    The sinuses are air-filled spaces within the bones of the face. They connect to the inside of the nose. Sinusitis is an inflammation of the tissue that lines the sinuses. Sinusitis can occur during a cold. It can also happen due to allergies to pollens and other particles in the air. Sinusitis can cause symptoms of sinus congestion and a feeling of fullness. A sinus infection causes fever, headache, and facial pain. There is often green or yellow fluid draining from the nose or into the back of the throat (post-nasal drip). You have been given antibiotics to treat this condition.   Home care    Take the full course of antibiotics as instructed. Don't stop taking them, even when you feel better.    Drink plenty of water, hot tea, and other liquids as directed by the healthcare provider. This may help thin nasal mucus. It also may help your sinuses drain fluids.    Heat may help soothe painful areas of your face. Use a towel soaked in hot water. Or,  the shower and direct the warm spray onto your face. Using a vaporizer along with a menthol rub at night may also help soothe symptoms.     An expectorant with guaifenesin may help thin nasal mucus and help your sinuses drain fluids. Talk with your provider or pharmacists before taking an over-the-counter (OTC) medicine if you have any questions about it or its side effects..    You can use an OTC decongestant, unless a similar medicine was prescribed to you. Nasal sprays work the fastest. Use one that contains phenylephrine or oxymetazoline. First blow your nose gently. Then use the spray. Don't use these medicines more often than directed on the label. If you do, your symptoms may get worse. You may also take pills that contain pseudoephedrine. Don t use products that combine multiple medicines. This is because side effects may be increased. Read labels. You can also ask the pharmacist for help. (People with high blood  pressure should not use decongestants. They can raise blood pressure.) Talk with your provider or pharmacist if you have any questions about the medicine..    OTC antihistamines may help if allergies contributed to your sinusitis. Talk with your provider or pharmacist if you have any questions about the medicine..    Don't use nasal rinses or irrigation during an acute sinus infection, unless your healthcare provider tells you to. Rinsing may spread the infection to other areas in your sinuses.    Use acetaminophen or ibuprofen to control pain, unless another pain medicine was prescribed to you. If you have chronic liver or kidney disease or ever had a stomach ulcer, talk with your healthcare provider before using these medicines. Never give aspirin to anyone under age 18 who is ill with a fever. It may cause severe liver damage.    Don't smoke. This can make symptoms worse.    Follow-up care  Follow up with your healthcare provider, or as advised.   When to seek medical advice  Call your healthcare provider if any of these occur:     Facial pain or headache that gets worse    Stiff neck    Unusual drowsiness or confusion    Swelling of your forehead or eyelids    Symptoms don't go away in 10 days    Vision problems, such as blurred or double vision    Fever of 100.4 F (38 C) or higher, or as directed by your healthcare provider  Call 911  Call 911 if any of these occur:     Seizure    Trouble breathing    Feeling dizzy or faint    Fingernails, skin or lips look blue, purple , or gray  Prevention  Here are steps you can take to help prevent an infection:     Keep good hand washing habits.    Don t have close contact with people who have sore throats, colds, or other upper respiratory infections.    Don t smoke, and stay away from secondhand smoke.    Stay up to date with of your vaccines.  [employer] last reviewed this educational content on 12/1/2019 2000-2021 [employer]. All rights reserved. This  information is not intended as a substitute for professional medical care. Always follow your healthcare professional's instructions.        Dear Neymar Traore    After reviewing your responses, I've been able to diagnose you with a sinus infection     Based on your responses and diagnosis, I have prescribed Augmentin   to treat your symptoms. I have sent this to your pharmacy.?     It is also important to stay well hydrated, get lots of rest and take over-the-counter decongestants,?tylenol?or ibuprofen if you?are able to?take those medications per your primary care provider to help relieve discomfort.?     It is important that you take?all of?your prescribed medication even if your symptoms are improving after a few doses.? Taking?all of?your medicine helps prevent the symptoms from returning.?     If your symptoms worsen, you develop severe headache, vomiting, high fever (>102), or are not improving in 7 days, please contact your primary care provider for an appointment or visit any of our convenient Walk-in Care or Urgent Care Centers to be seen which can be found on our website?here.?     Thanks again for choosing?us?as your health care partner,?   ?  Therese Burnson, SRINATH?

## 2022-12-01 NOTE — TELEPHONE ENCOUNTER
Prescription resent. Kevin Vines for Call:  Other prescription    Detailed comments: Patient had a virtual urgent care appt yesterday and [facility] in [place], [place] never received his amoxicillin.    Phone Number Patient can be reached at: Cell number on file:    Telephone Information:   Mobile [phone]       Best Time: Anytime    Can we leave a detailed message on this number? YES    Call taken on 12/1/2022 at 4:36 PM by Tayla Michaud

## 2022-12-03 ENCOUNTER — HEALTH MAINTENANCE LETTER (OUTPATIENT)
Age: 57
End: 2022-12-03

## 2023-03-02 DIAGNOSIS — E03.9 HYPOTHYROIDISM, UNSPECIFIED TYPE: ICD-10-CM

## 2023-03-02 RX ORDER — LEVOTHYROXINE SODIUM 150 UG/1
TABLET ORAL
Qty: 90 TABLET | Refills: 0 | Status: SHIPPED | OUTPATIENT
Start: 2023-03-02

## 2023-03-02 NOTE — LETTER
March 3, 2023      Neymar Traore  [address] [facility] [address]        Neymar Traore      We recently received a refill request for your levothyroxine (SYNTHROID/LEVOTHROID) 150 MCG tablet. We have sent in a refill for you to your pharmacy.    Our records show you are due for a follow up visit with your provider.     Please call the clinic at [phone] to schedule as the providers are booking out.        Sincerely,        Dyan Molina/

## 2023-03-02 NOTE — TELEPHONE ENCOUNTER
Routing refill request to provider for review/approval because:  Labs not current:    TSH   Date Value Ref Range Status   12/27/2021 0.67 0.40 - 4.00 mU/L Final   10/06/2020 1.97 0.40 - 4.00 mU/L Final     Gabi RICHARDSON RN, BSN

## 2023-04-03 ENCOUNTER — TRANSFERRED RECORDS (OUTPATIENT)
Dept: FAMILY MEDICINE | Facility: CLINIC | Age: 58
End: 2023-04-03

## 2023-05-30 DIAGNOSIS — E03.9 HYPOTHYROIDISM, UNSPECIFIED TYPE: Primary | ICD-10-CM

## 2023-05-30 DIAGNOSIS — E03.9 HYPOTHYROIDISM, UNSPECIFIED TYPE: ICD-10-CM

## 2023-05-30 RX ORDER — LEVOTHYROXINE SODIUM 150 UG/1
150 TABLET ORAL DAILY
Qty: 60 TABLET | Refills: 0 | Status: SHIPPED | OUTPATIENT
Start: 2023-05-30

## 2023-05-30 NOTE — TELEPHONE ENCOUNTER
Routing refill request to provider for review/approval because:  Kymberly given x1 and patient did not follow up, please advise  TSH   Date Value Ref Range Status   12/27/2021 0.67 0.40 - 4.00 mU/L Final   10/06/2020 1.97 0.40 - 4.00 mU/L Final       Pt was called with kymberly refill. Mychart sent as well and pt read mychart. Letter was also sent but no appt made  Gabi RICHARDSON RN, BSN

## 2023-05-31 RX ORDER — LEVOTHYROXINE SODIUM 150 UG/1
TABLET ORAL
Qty: 90 TABLET | Refills: 0 | OUTPATIENT
Start: 2023-05-31

## 2023-11-01 ENCOUNTER — TRANSFERRED RECORDS (OUTPATIENT)
Dept: HEALTH INFORMATION MANAGEMENT | Facility: CLINIC | Age: 58
End: 2023-11-01
Payer: COMMERCIAL

## 2023-11-02 ENCOUNTER — TRANSFERRED RECORDS (OUTPATIENT)
Dept: HEALTH INFORMATION MANAGEMENT | Facility: CLINIC | Age: 58
End: 2023-11-02
Payer: COMMERCIAL

## 2023-11-30 NOTE — TELEPHONE ENCOUNTER
Gigi message sent to patient to schedule  ]  Dyan Molina/       plan of care explained/warm blanket provided

## 2023-12-14 ENCOUNTER — TRANSFERRED RECORDS (OUTPATIENT)
Dept: HEALTH INFORMATION MANAGEMENT | Facility: CLINIC | Age: 58
End: 2023-12-14
Payer: COMMERCIAL

## 2024-01-13 ENCOUNTER — HEALTH MAINTENANCE LETTER (OUTPATIENT)
Age: 59
End: 2024-01-13

## 2024-02-26 ENCOUNTER — TRANSFERRED RECORDS (OUTPATIENT)
Dept: HEALTH INFORMATION MANAGEMENT | Facility: CLINIC | Age: 59
End: 2024-02-26

## 2024-12-05 ENCOUNTER — TRANSFERRED RECORDS (OUTPATIENT)
Dept: HEALTH INFORMATION MANAGEMENT | Facility: CLINIC | Age: 59
End: 2024-12-05

## 2024-12-18 ENCOUNTER — APPOINTMENT (OUTPATIENT)
Age: 59
Setting detail: DERMATOLOGY
End: 2024-12-18

## 2024-12-18 DIAGNOSIS — D485 NEOPLASM OF UNCERTAIN BEHAVIOR OF SKIN: ICD-10-CM

## 2024-12-18 PROBLEM — D48.5 NEOPLASM OF UNCERTAIN BEHAVIOR OF SKIN: Status: ACTIVE | Noted: 2024-12-18

## 2024-12-18 PROCEDURE — 11102 TANGNTL BX SKIN SINGLE LES: CPT

## 2024-12-18 PROCEDURE — ? BIOPSY BY SHAVE METHOD

## 2024-12-18 ASSESSMENT — LOCATION SIMPLE DESCRIPTION DERM: LOCATION SIMPLE: LEFT NOSE

## 2024-12-18 ASSESSMENT — LOCATION DETAILED DESCRIPTION DERM: LOCATION DETAILED: LEFT NASAL SIDEWALL

## 2024-12-18 ASSESSMENT — LOCATION ZONE DERM: LOCATION ZONE: NOSE

## 2025-01-25 ENCOUNTER — HEALTH MAINTENANCE LETTER (OUTPATIENT)
Age: 60
End: 2025-01-25